# Patient Record
Sex: MALE | Race: WHITE | NOT HISPANIC OR LATINO | URBAN - METROPOLITAN AREA
[De-identification: names, ages, dates, MRNs, and addresses within clinical notes are randomized per-mention and may not be internally consistent; named-entity substitution may affect disease eponyms.]

---

## 2021-11-23 ENCOUNTER — HOSPITAL ENCOUNTER (EMERGENCY)
Facility: HOSPITAL | Age: 26
Discharge: HOME | End: 2021-11-23
Attending: EMERGENCY MEDICINE | Admitting: EMERGENCY MEDICINE

## 2021-11-23 ENCOUNTER — APPOINTMENT (OUTPATIENT)
Dept: GENERAL RADIOLOGY | Facility: HOSPITAL | Age: 26
End: 2021-11-23

## 2021-11-23 VITALS
BODY MASS INDEX: 23.7 KG/M2 | HEART RATE: 75 BPM | DIASTOLIC BLOOD PRESSURE: 90 MMHG | WEIGHT: 160 LBS | TEMPERATURE: 97.3 F | HEIGHT: 69 IN | RESPIRATION RATE: 16 BRPM | SYSTOLIC BLOOD PRESSURE: 166 MMHG | OXYGEN SATURATION: 99 %

## 2021-11-23 DIAGNOSIS — S52.124A CLOSED NONDISPLACED FRACTURE OF HEAD OF RIGHT RADIUS, INITIAL ENCOUNTER: Primary | ICD-10-CM

## 2021-11-23 PROCEDURE — 73080 X-RAY EXAM OF ELBOW: CPT | Performed by: FAMILY MEDICINE

## 2021-11-23 PROCEDURE — 99283 EMERGENCY DEPT VISIT LOW MDM: CPT

## 2021-11-23 RX ORDER — HYDROCODONE BITARTRATE AND ACETAMINOPHEN 7.5; 325 MG/1; MG/1
1 TABLET ORAL EVERY 6 HOURS PRN
Qty: 12 TABLET | Refills: 0 | Status: SHIPPED | OUTPATIENT
Start: 2021-11-23 | End: 2021-12-06

## 2021-11-23 RX ORDER — HYDROCODONE BITARTRATE AND ACETAMINOPHEN 7.5; 325 MG/1; MG/1
1 TABLET ORAL ONCE
Status: COMPLETED | OUTPATIENT
Start: 2021-11-23 | End: 2021-11-23

## 2021-11-23 RX ADMIN — HYDROCODONE BITARTRATE AND ACETAMINOPHEN 1 TABLET: 7.5; 325 TABLET ORAL at 20:33

## 2021-11-24 NOTE — ED PROVIDER NOTES
EMERGENCY DEPARTMENT ENCOUNTER    Room Number:  A01/01  Date of encounter:  11/24/2021  PCP: Provider, No Known  Historian: Patient      HPI:  Chief Complaint: right elbow pain       Context: Arnold Mitchell is a 26 y.o. RHD male who presents to the ED c/o right arm injury.  Patient states that around 4:00 today he was in a chair, fell, and landed on his right elbow.  Since that time he has had increasing pain and swelling in his right elbow that shoots to the right forearm.  Pain is throbbing in nature.  Pain is worse with pronation/supination, and extension/flexion.  Denies any other injuries, denies any head injury, numbness, tingling, or weakness.      PAST MEDICAL HISTORY  Active Ambulatory Problems     Diagnosis Date Noted   • No Active Ambulatory Problems     Resolved Ambulatory Problems     Diagnosis Date Noted   • No Resolved Ambulatory Problems     No Additional Past Medical History         PAST SURGICAL HISTORY  History reviewed. No pertinent surgical history.      FAMILY HISTORY  History reviewed. No pertinent family history.      SOCIAL HISTORY  Social History     Socioeconomic History   • Marital status: Single   Tobacco Use   • Smoking status: Never Smoker   • Smokeless tobacco: Never Used   Substance and Sexual Activity   • Alcohol use: Not Currently   • Sexual activity: Defer         ALLERGIES  Patient has no known allergies.        REVIEW OF SYSTEMS  Review of Systems     All systems reviewed and negative except for those discussed in HPI.       PHYSICAL EXAM    I have reviewed the triage vital signs and nursing notes.    ED Triage Vitals [11/23/21 1906]   Temp Heart Rate Resp BP SpO2   97.3 °F (36.3 °C) 75 16 166/90 99 %      Temp src Heart Rate Source Patient Position BP Location FiO2 (%)   Tympanic -- -- -- --       Physical Exam  GENERAL: Alert and oriented x3, not distressed  HENT: nares patent  EYES: no scleral icterus  CV: regular rhythm, regular rate, intact radial pulse  RESPIRATORY: normal  effort  MUSCULOSKELETAL: no deformity, decreased ROM at the right elbow   NEURO: alert, moves all extremities, follows commands  SKIN: warm, dry, intact        LAB RESULTS  No results found for this or any previous visit (from the past 24 hour(s)).    Ordered the above labs and independently reviewed the results.        RADIOLOGY  XR Elbow 3+ View Right    Result Date: 11/23/2021  THREE RADIOGRAPHIC VIEWS OF THE RIGHT ELBOW  CLINICAL HISTORY: Right elbow pain after fall today.  FINDINGS: Three radiographic views of the right elbow demonstrate a displaced anterior and posterior fat pad secondary to a nondisplaced radial head fracture.  These findings were contemporaneously discussed with Dr. Cerna.  This report was finalized on 11/23/2021 7:21 PM by Dr. Dipak Yeung M.D.        I ordered the above noted radiological studies. Reviewed by me and discussed with radiologist.  See dictation for official radiology interpretation.      PROCEDURES    Procedures      MEDICATIONS GIVEN IN ER    Medications   HYDROcodone-acetaminophen (NORCO) 7.5-325 MG per tablet 1 tablet (1 tablet Oral Given 11/23/21 2033)         PROGRESS, DATA ANALYSIS, CONSULTS, AND MEDICAL DECISION MAKING    All labs have been independently reviewed by me.  All radiology studies have been reviewed by me and discussed with radiologist dictating the report.   EKG's independently viewed and interpreted by me.  Discussion below represents my analysis of pertinent findings related to patient's condition, differential diagnosis, treatment plan and final disposition.    DDx: Includes but not limited to forearm fracture, radial head fracture, elbow fracture, elbow sprain    ED Course as of 11/24/21 0331   e Nov 23, 2021 2045 Splint Application: Right arm  Splint Type: Posterior long-arm Ortho-Glass  Indication: Right radial head fracture  Splint placed by PA-C  Post splint application:   1) neurovascularly intact   2) good position  Discussed splint care  with patient  Discussed PMD/orthopedic follow up   [RUTHY]      ED Course User Index  [RUTHY] Grayson Hussein PA       MDM: Patient was seen at [facility] and sent to the ER for further evaluation of a nondisplaced radial head fracture.  X-ray images were able to be viewed remotely, patient was splinted, and given orthopedic referral.  Vital signs are stable, patient is safe for discharge.    PPE: The patient wore a surgical mask throughout the entire patient encounter. I wore an N95.    AS OF 03:31 EST VITALS:    BP - 166/90  HR - 75  TEMP - 97.3 °F (36.3 °C) (Tympanic)  O2 SATS - 99%        DIAGNOSIS  Final diagnoses:   Closed nondisplaced fracture of head of right radius, initial encounter         DISPOSITION  DISCHARGE    Patient discharged in stable condition.    Reviewed implications of results, diagnosis, meds, responsibility to follow up, warning signs and symptoms of possible worsening, potential complications and reasons to return to ER.    Patient/Family voiced understanding of above instructions.    Discussed plan for discharge, as there is no emergent indication for admission. Patient referred to primary care provider for BP management due to today's BP. Pt/family is agreeable and understands need for follow up and repeat testing.  Pt is aware that discharge does not mean that nothing is wrong but it indicates no emergency is present that requires admission and they must continue care with follow-up as given below or physician of their choice.     FOLLOW-UP  Claudio Pichardo MD  [address] [id number] [phone]    Schedule an appointment as soon as possible for a visit            Medication List      New Prescriptions    HYDROcodone-acetaminophen 7.5-325 MG per tablet  Commonly known as: NORCO  Take 1 tablet by mouth Every 6 (Six) Hours As Needed for Moderate Pain .           Where to Get Your Medications      You can get these medications from any pharmacy    Bring a  paper prescription for each of these medications  · HYDROcodone-acetaminophen 7.5-325 MG per tablet                    Grayson Hussein PA  11/24/21 033

## 2021-11-24 NOTE — DISCHARGE INSTRUCTIONS
Wear splint and sling, apply ice and elevate, pain medicine as needed, call first thing in the morning to schedule follow up with the orthopedist.

## 2021-11-24 NOTE — ED NOTES
C/o of radial fracture, was seen today at [facility].   This rn wearing mask and goggles. Pt wearing mask during encounter.        Marisol Melchor, LILLIAN  11/23/21 1905

## 2021-11-29 ENCOUNTER — OFFICE VISIT (OUTPATIENT)
Dept: ORTHOPEDIC SURGERY | Facility: CLINIC | Age: 26
End: 2021-11-29

## 2021-11-29 VITALS — BODY MASS INDEX: 26.42 KG/M2 | HEIGHT: 69 IN | WEIGHT: 178.4 LBS | TEMPERATURE: 97.8 F

## 2021-11-29 DIAGNOSIS — S52.124A CLOSED NONDISPLACED FRACTURE OF HEAD OF RIGHT RADIUS, INITIAL ENCOUNTER: Primary | ICD-10-CM

## 2021-11-29 PROCEDURE — 99204 OFFICE O/P NEW MOD 45 MIN: CPT | Performed by: ORTHOPAEDIC SURGERY

## 2021-11-29 RX ORDER — ACETAMINOPHEN 500 MG
500 TABLET ORAL EVERY 6 HOURS PRN
COMMUNITY

## 2021-11-29 NOTE — PROGRESS NOTES
"General Exam    Patient: Arnold Mitchell    YOB: 1995    Medical Record Number: [id number]    Chief Complaints: Right elbow pain    History of Present Illness:     26 y.o. male patient who presents for evaluation and treatment of right radial head fracture.  Patient was seen 6 days ago in the emergency department after a fall from a stepladder.  X-rays were taken and showed a nondisplaced right radial head fracture.  Patient was placed in a splint and sling total follow-up orthopedics.  Patient states he is been doing fine since still some pain.    Patient is right-hand dominant.    Denies any numbness or tingling.  Denies any fevers, cough or shortness of breath.    Allergies: No Known Allergies    Home Medications:      Current Outpatient Medications:   •  acetaminophen (TYLENOL) 500 MG tablet, Take 500 mg by mouth Every 6 (Six) Hours As Needed for Mild Pain ., Disp: , Rfl:   •  HYDROcodone-acetaminophen (NORCO) 7.5-325 MG per tablet, Take 1 tablet by mouth Every 6 (Six) Hours As Needed for Moderate Pain ., Disp: 12 tablet, Rfl: 0    No past medical history on file.    No past surgical history on file.    Social History     Occupational History   • Not on file   Tobacco Use   • Smoking status: Never Smoker   • Smokeless tobacco: Never Used   Substance and Sexual Activity   • Alcohol use: Not Currently   • Drug use: Not on file   • Sexual activity: Defer      Social History     Social History Narrative   • Not on file       No family history on file.    Review of Systems:      Constitutional: Denies fever, shaking or chills         All other pertinent positives and negatives as noted above in HPI.    Physical Exam: 26 y.o. male    Vitals:    11/29/21 0846   Temp: 97.8 °F (36.6 °C)   Weight: 80.9 kg (178 lb 6.4 oz)   Height: 175.3 cm (69\")       General:  Patient is awake and alert.  Appears in no acute distress or discomfort.        Musculoskeletal/Extremities:    Right upper extremity; splint removed.  " AIN, PIN and ulnar nerve intact.  Sensation tact distally.  Some elbow swelling.  Contralateral side.  Range of motion is limited due to pain and stiffness.  Was able to get him to about 20 degrees of extension and flexion to 100 1020 degrees just limited due to pain no apparent mechanical block.  Patient is able to pronate supinate but still limited due to some pain and soreness did not have mechanical block appearance.  Gait is intact.         Radiology:       AP and lateral right elbow taken reviewed to evaluate the patient's complaint/s.    Imaging shows no to minimal displacement radial head/neck fracture less than 2 mm.  Seems to be more of a cortical irregularity compared to initial injury films however the injury films there is a radiographic arrow right along the cortex so hard to fully appreciate if there is a true difference or not because the area was overlying the cortex.  Still some swelling with increased fat pad sign.  No other significant changes.        Assessment: Right radial head/neck fracture      Plan:      Recommend conservative treatment this time the cortical disruption is less than 2 mm with no significant displacement noted.  Did not appreciate a mechanical block but only limited to some pain and stiffness as he has been in a splint for the past week.  Recommend keep an eye on this we will have him wear a sling for comfort of the next week instructed him to take it at least 5 times a day to do gentle elbow range of motion with flexion extension some pronation supination.  Tylenol for pain avoid anti-inflammatories.  Calcium and vitamin D for bone healing.-Follow-up in 1 week and recheck films if there is any increase in displacement or of concern will refer him to elbow/hand specialist if needed otherwise we can continue conservative treatment.           We will plan for follow up 1 week.    All questions were answered.  Patient understands and agrees with the plan.    Claudio Pichardo,  MD    11/29/2021    CC to Provider, No Known

## 2021-12-02 ENCOUNTER — TELEPHONE (OUTPATIENT)
Dept: ORTHOPEDIC SURGERY | Facility: CLINIC | Age: 26
End: 2021-12-02

## 2021-12-02 NOTE — TELEPHONE ENCOUNTER
Provider: DR. ESTRELLA FIELD  Caller: [facility] [employer]  Relationship to Patient: W/C   Pharmacy:   Phone Number: [phone]  Reason for Call: NEED WORK STATUS FROM VISIT ON 11/29/21

## 2021-12-03 ENCOUNTER — PATIENT ROUNDING (BHMG ONLY) (OUTPATIENT)
Dept: ORTHOPEDIC SURGERY | Facility: CLINIC | Age: 26
End: 2021-12-03

## 2021-12-03 NOTE — PROGRESS NOTES
December 3, 2021    Hello, may I speak with Arnoldfreedom Mitchell?    My name is Tiana     I am  with [facility]  [facility]  [address] [address] [phone].    Before we get started may I verify your date of birth? 1995    I am calling to officially welcome you to our practice and ask about your recent visit. Is this a good time to talk? Left messge     Tell me about your visit with us. What things went well?  left message      We're always looking for ways to make our patients' experiences even better. Do you have recommendations on ways we may improve? left message     Overall were you satisfied with your first visit to our practice? Left message        I appreciate you taking the time to speak with me today. Is there anything else I can do for you? no    Thank you, and have a great day.

## 2021-12-06 ENCOUNTER — OFFICE VISIT (OUTPATIENT)
Dept: ORTHOPEDIC SURGERY | Facility: CLINIC | Age: 26
End: 2021-12-06

## 2021-12-06 VITALS — TEMPERATURE: 97.6 F | BODY MASS INDEX: 24.88 KG/M2 | WEIGHT: 168 LBS | HEIGHT: 69 IN

## 2021-12-06 DIAGNOSIS — S52.124D CLOSED NONDISPLACED FRACTURE OF HEAD OF RIGHT RADIUS WITH ROUTINE HEALING, SUBSEQUENT ENCOUNTER: Primary | ICD-10-CM

## 2021-12-06 DIAGNOSIS — R52 PAIN: ICD-10-CM

## 2021-12-06 PROCEDURE — 99212 OFFICE O/P EST SF 10 MIN: CPT | Performed by: ORTHOPAEDIC SURGERY

## 2021-12-06 PROCEDURE — 73070 X-RAY EXAM OF ELBOW: CPT | Performed by: ORTHOPAEDIC SURGERY

## 2021-12-06 NOTE — PROGRESS NOTES
"Follow Up      Patient: Arnold Mitchell        YOB: 1995            Chief Complaints: Right arm pain      History of Present Illness: Patient presents 1 week follow-up status post right radial head/neck fracture.  Patient has mainly been able to come out of the sling feels that pain and swelling have improved range of motion has improved.  Denies any distal numbness or tingling.      Physical Exam: 26 y.o. male  General Appearance:    Alert, cooperative, in no acute distress                   Vitals:    12/06/21 0954   Temp: 97.6 °F (36.4 °C)   Weight: 76.2 kg (168 lb)   Height: 175.3 cm (69\")        Patient is alert and read ×3 no acute distress appears her above-listed at height weight and age.  Affect is normal respiratory rate is normal unlabored. Heart rate regular rate rhythm, sclera, dentition and hearing are normal for the purpose of this exam.      Ortho Exam    Right upper extremity; no overt pain with palpation to the right elbow area.  Range of motion flexion full equal contralateral side extension limiting about 20 degrees but no mechanical block noted supination pronation improved.  Neuromuscular intact distally.      Radiographs; 2 views right elbow/forearm taken reviewed imaging shows no increase of fracture displacement is less than 2 mm compared to recent imaging.    Assessment/Plan:      Right radial head fracture    Patient's function is improved is no mechanical block.  Radiographs do not show any worsening of fracture alignment.  Plan continue conservative measures and management patient will follow up in 1 week with repeat films to assess at that point if continuing to look good can span out visits and we will plan initiate physical therapy shortly.  Nonweightbearing to right upper extremity at this time.  Continue calcium vitamin D for bone health and healing.  Tylenol for pain avoid anti-inflammatories at this time.  Patient may discontinue sling.  All questions answered.  Patient " understands agrees with plan.

## 2021-12-13 ENCOUNTER — OFFICE VISIT (OUTPATIENT)
Dept: ORTHOPEDIC SURGERY | Facility: CLINIC | Age: 26
End: 2021-12-13

## 2021-12-13 VITALS — RESPIRATION RATE: 18 BRPM | HEIGHT: 69 IN | WEIGHT: 168 LBS | TEMPERATURE: 98.2 F | BODY MASS INDEX: 24.88 KG/M2

## 2021-12-13 DIAGNOSIS — R52 PAIN: ICD-10-CM

## 2021-12-13 DIAGNOSIS — S52.124D CLOSED NONDISPLACED FRACTURE OF HEAD OF RIGHT RADIUS WITH ROUTINE HEALING, SUBSEQUENT ENCOUNTER: Primary | ICD-10-CM

## 2021-12-13 PROCEDURE — 73070 X-RAY EXAM OF ELBOW: CPT | Performed by: ORTHOPAEDIC SURGERY

## 2021-12-13 PROCEDURE — 99212 OFFICE O/P EST SF 10 MIN: CPT | Performed by: ORTHOPAEDIC SURGERY

## 2021-12-13 NOTE — PROGRESS NOTES
"Elbow follow Up      Patient: Arnold Mitchell        YOB: 1995            Chief Complaints: Right elbow pain      History of Present Illness: Patient presents 3 weeks status post right radial head neck fracture.  States his pain and range of motion have continued to in proved.  Denies any distal numbness or tingling.  No other complaints.      Physical Exam: 26 y.o. male  General Appearance:    Alert, cooperative, in no acute distress                   Vitals:    12/13/21 0947   Resp: 18   Temp: 98.2 °F (36.8 °C)   Weight: 76.2 kg (168 lb)   Height: 175.3 cm (69\")        Patient is alert and read ×3 no acute distress appears her above-listed at height weight and age.  Affect is normal respiratory rate is normal unlabored. Heart rate regular rate rhythm, sclera, dentition and hearing are normal for the purpose of this exam.      Ortho Exam      Right upper extremity: Swelling decreased.  No tenderness palpation.  Range of motion improved flexion is full equal contralateral side about 5 degrees extensor lag compared to the contralateral side.  Improved supination pronation minimal discomfort.  Neurovascular tact distally.    Radiographs:  2 views of the right elbow taken reviewed and compared to previous films show no significant change or displacement of fracture.      Assessment/Plan:      3 weeks status post right radial head/neck fracture    Plan to continue conservative management.  No significant changes seen on imaging.  Range of motion and pain and continue to improve.  We will order some formal physical therapy they can start next week.  Still no heavy lifting pushing or pulling.  Nothing more than 1 to 2 pounds.  Recommend with therapy some gentle stretching range of motion and can start a strengthening at the 6-week onel solid x-rays look good.  Continue calcium and vitamin D for bone health.  Tylenol for pain.  Patient will follow up with me in 3 weeks.  All questions were answered.  Patient " understands agrees with plan.

## 2021-12-14 ENCOUNTER — TELEPHONE (OUTPATIENT)
Dept: ORTHOPEDIC SURGERY | Facility: CLINIC | Age: 26
End: 2021-12-14

## 2021-12-14 NOTE — TELEPHONE ENCOUNTER
Caller: RONEN    Relationship: SUSAN JOHNSON    Best call back number: [phone]    What form or medical record are you requesting: OFFICE NOTES FROM 11/29, 12/6, 12/13 AND WORK RESTRICTIONS    Who is requesting this form or medical record from you: SUSAN JOHNSON    How would you like to receive the form or medical records (pick-up, mail, fax):  If fax, what is the fax number: [phone]    Timeframe paperwork needed: ASAP

## 2021-12-15 ENCOUNTER — HOSPITAL ENCOUNTER (OUTPATIENT)
Dept: PHYSICAL THERAPY | Facility: HOSPITAL | Age: 26
Setting detail: THERAPIES SERIES
Discharge: HOME | End: 2021-12-15

## 2021-12-15 DIAGNOSIS — M25.621 DECREASED RANGE OF MOTION OF RIGHT ELBOW: ICD-10-CM

## 2021-12-15 DIAGNOSIS — S52.124D CLOSED NONDISPLACED FRACTURE OF HEAD OF RIGHT RADIUS WITH ROUTINE HEALING, SUBSEQUENT ENCOUNTER: Primary | ICD-10-CM

## 2021-12-15 DIAGNOSIS — R29.898 DECREASED GRIP STRENGTH OF RIGHT HAND: ICD-10-CM

## 2021-12-15 PROCEDURE — 97161 PT EVAL LOW COMPLEX 20 MIN: CPT

## 2021-12-15 PROCEDURE — 97110 THERAPEUTIC EXERCISES: CPT

## 2021-12-15 NOTE — THERAPY EVALUATION
Outpatient Physical Therapy Ortho Initial Evaluation  [facility]     Patient Name: Arnold Mitchell  : 1995  MRN: [id number]  Today's Date: 12/15/2021      Visit Date: 12/15/2021    There is no problem list on file for this patient.       No past medical history on file.     No past surgical history on file.    Visit Dx:     ICD-10-CM ICD-9-CM   1. Closed nondisplaced fracture of head of right radius with routine healing, subsequent encounter  S52.124D V54.12   2. Decreased range of motion of right elbow  M25.621 719.52   3. Decreased  strength of right hand  R29.898 729.89          Patient History     Row Name 12/15/21 1600             History    Chief Complaint Pain  -SANTOSH      Type of Pain Elbow pain  -SANTOSH      Date Current Problem(s) Began 21  -SANTOSH      Brief Description of Current Complaint Arnold Mitchell is a 26 y.o. male who presents to physical therapy today with primary compliant of R elbow/forearm pain that began 21 when patient fell onto R elbow  when climbing up step ladder at work. Patient experienced closed non-displaced fracture of right radial head. Patient saw MD on 21 and was taken out of sling and released to lift no more than 1-2 pounds, no push/pull, and gentle strength/ROM. Patient reports he is not currently working and expected to return to work (light duty)  per MD. Patient expresses he is required to lift 30-50 pounds intermittently while working.  Arnold Mitchell reports difficulty/increased pain with forward reaching, quick elbow flexion/extension, resting on elbow, opening jars and gripping. Pain relieving factors include tylenol, Calcium supplement, and ice. PMH includes L wrist fracture 12 years ago. Arnold Mitchell primary goal for attending skilled physical therapy is to improve mobility/strength and return to work related duties.  -SANTOSH      Previous treatment for THIS PROBLEM Medication; Other (comment)  splint/sling  -SANTOSH      Patient/Caregiver Goals Relieve  pain; Improve mobility; Improve strength; Know what to do to help the symptoms; Return to prior level of function; Return to work  -SANTOSH      Hand Dominance right-handed  -SANTOSH      Occupation/sports/leisure activities works at [employer]  -SANTOSH      What clinical tests have you had for this problem? X-ray  -SANTOSH      Results of Clinical Tests see epic  -SANTOSH              Pain     Pain Location Elbow  -SANTOSH      Pain at Present 0  -SANTOSH      Pain at Best 0  -SANTOSH      Pain at Worst 8  -SANTOSH      Pain Frequency Intermittent  -SANTOSH      Pain Description Shooting; Sore  -SANTOSH      What Performance Factors Make the Current Problem(s) WORSE? forward reaching, quick elbow flexion/extension, resting on elbow, opening jars and gripping  -SANTOSH      What Performance Factors Make the Current Problem(s) BETTER? tylenol, Calcium supplement, ice  -SANTOSH      Is your sleep disturbed? Yes  -SANTOSH              Fall Risk Assessment    Any falls in the past year: Yes  -SANTOSH      Number of falls reported in the last 12 months 1  -SANTOSH      Factors that contributed to the fall: --  fell off ladder at work leading to radial head fracture  -SANTOSH              Services    Prior Rehab/Home Health Experiences No  -SANTOSH              Daily Activities    Primary Language English  -SANTOSH      Pt Participated in POC and Goals Yes  -SANTOSH              Safety    Are you being hurt, hit, or frightened by anyone at home or in your life? No  -SANTOSH      Are you being neglected by a caregiver No  -SANTOSH      Have you had any of the following issues with N/A  -SANTOSH            User Key  (r) = Recorded By, (t) = Taken By, (c) = Cosigned By    Initials Name Provider Type    Iveth Sun, PT Physical Therapist                 PT Ortho     Row Name 12/15/21 1600       Precautions and Contraindications    Precautions see epic referral  -SANTOSH       Posture/Observations    Alignment Options Forward head; Rounded shoulders  -SANTOSH    Forward Head Mild  -SANTOSH    Rounded Shoulders Mild  -SANTOSH     Observations Edema  -SANTOSH    Posture/Observations Comments increased R elbow carrying angle  -SANTOSH       Special Tests/Palpation    Special Tests/Palpation Elbow/Forearm  -SANTOSH       Elbow/Forearm Palpation    Elbow/Forearm Palpation? Yes  -SANTOSH    Radial Head Tender  -SANTOSH       General ROM    RT Upper Ext Rt Elbow Extension/Flexion; Rt Elbow Supination; Rt Elbow Pronation; Rt Wrist Flexion; Rt Wrist Extension  -SANTOSH    LT Upper Ext Lt Elbow Extension/Flexion; Lt Elbow Supination; Lt Elbow Pronation  -SANTOSH       Right Upper Ext    Rt Elbow Extension/Flexion AROM lacking   pain noted with flexion  -SANTOSH    Rt Elbow Supination AROM 55  -SANTOSH    Rt Elbow Pronation AROM 75  -SANTOSH    Rt Wrist Flexion AROM 85  -SANTOSH    Rt Wrist Extension AROM 48  -SANTOSH       Left Upper Ext    Lt Elbow Extension/Flexion AROM 2-0-141  -SANTOSH    Lt Elbow Supination AROM 65  -SANTOSH    Lt Elbow Pronation AROM 78  -SANTOSH       MMT (Manual Muscle Testing)    Rt Upper Ext Rt Shoulder Flexion; Rt Shoulder ABduction; Rt Shoulder Internal Rotation; Rt Shoulder External Rotation; Rt Elbow Flexion; Rt Elbow Extension; Rt Forearm Supination; Rt Forearm Pronation; Rt Wrist Flexion; Rt Wrist Extension  -SANTOSH    Lt Upper Ext Lt Shoulder Flexion; Lt Shoulder ABduction; Lt Shoulder Internal Rotation; Lt Shoulder External Rotation; Lt Elbow Extension; Lt Elbow Flexion; Lt Forearm Supination; Lt Forearm Pronation; Lt Wrist Flexion; Lt Wrist Extension  -SANTOSH       MMT Right Upper Ext    Rt Shoulder Flexion MMT, Gross Movement (4/5) good  -SANTOSH    Rt Shoulder ABduction MMT, Gross Movement (4+/5) good plus  -SANTOSH    Rt Shoulder Internal Rotation MMT, Gross Movement (4/5) good  -SANTOSH    Rt Shoulder External Rotation MMT, Gross Movement (4/5) good  -SANTOSH    Rt Elbow Flexion MMT, Gross Movement: (4/5) good  -SANTOSH    Rt Elbow Extension MMT, Gross Movement: (4-/5) good minus  pain noted  -SANTOSH    Rt Forearm Supination MMT, Gross Movement (4/5) good  pain noted  -SANTOSH    Rt Forearm Pronation MMT, Gross Movement  (4/5) good  -SANTOSH    Rt Wrist Flexion MMT, Gross Movement (4/5) good  -SANTOSH    Rt Wrist Extension MMT, Gross Movement (4-/5) good minus  pain noted  -SANTOSH       MMT Left Upper Ext    Lt Shoulder Flexion MMT, Gross Movement (4+/5) good plus  -SANTOSH    Lt Shoulder ABduction MMT, Gross Movement (4+/5) good plus  -SANTOSH    Lt Shoulder Internal Rotation MMT, Gross Movement (5/5) normal  -SANTOSH    Lt Shoulder External Rotation MMT, Gross Movement (4+/5) good plus  -SANTOSH    Lt Elbow Flexion MMT, Gross Movement (5/5) normal  -SANTOSH    Lt Elbow Extension MMT, Gross Movement (5/5) normal  -SANTOSH    Lt Forearm Supination MMT, Gross Movement (5/5) normal  -SANTOSH    Lt Forearm Pronation MMT, Gross Movement (5/5) normal  -SANTOSH    Lt Wrist Flexion MMT, Gross Movement (5/5) normal  -SANTOSH    Lt Wrist Extension MMT, Gross Movement (5/5) normal  -SANTOSH        Strength Right    # Reps 3  -SANTOSH    Right Rung 2  -SANTOSH    Right  Test 1 20  -SANTOSH    Right  Test 2 25  -SANTOSH    Right  Test 3 20  -SANTOSH     Strength Average Right 21.67  -SANTOSH        Strength Left    # Reps 3  -SANTOSH    Left Rung 2  -SANTOSH    Left  Test 1 76  -SANTOSH    Left  Test 2 50  -SANTOSH    Left  Test 3 70  -SANTOSH     Strength Average Left 65.33  -SANTOSH       Sensation    Sensation WNL? WNL  -SANTOSH       Hand  Strength     Strength Affected Side Bilateral  -SANTOSH          User Key  (r) = Recorded By, (t) = Taken By, (c) = Cosigned By    Initials Name Provider Type    Iveth Sun, PT Physical Therapist                            Therapy Education  Education Details: Educated on anatomy/pathology, therapy expectations, evaluation findings, current restrictions per MD order, POC and HEP  Given: HEP, Symptoms/condition management, Pain management  Program: New  How Provided: Verbal, Demonstration, Written  Provided to: Patient  Level of Understanding: Verbalized, Demonstrated  92506 - PT Self Care/Mgmt Minutes: 5      PT OP Goals     Row Name 12/15/21 1600          PT Short Term  Goals    STG Date to Achieve 01/14/22  -SANTOSH     STG 1 Pt will be independent and compliant with initial HEP for long term management of symptoms and prevention of future occurrence.  -SANTOSH     STG 1 Progress New  -     STG 2 Pt will report pain rated 3/10 at worst in order to demonstrate ability to return to normalized ADLs, functional activities, and light duty work related activities.  -SANTOSH     STG 2 Progress New  -     STG 3 Pt will demonstrate R elbow ROM ext=5 deg in order to improve ability to perform ADLs.  -     STG 3 Progress New  -            Long Term Goals    LTG Date to Achieve 02/13/22  -SANTOSH     LTG 1 Pt will be independent and compliant with advanced HEP for long term management of symptoms and prevention of future occurrence.  -SANTOSH     LTG 1 Progress New  -     LTG 2 Pt will demonstrate R elbow ROM 0-140 in order to improve ability to perform ADLs and participate and functional related tasks.  -     LTG 2 Progress New  -     LTG 3 Pt will demonstrate R  strength to 50# in order to increase ability to perform fine motor tasks including gripping and writing while participating in work related duties.  -     LTG 3 Progress New  -     LTG 4 Pt will reduce level of perceived disability as measured by the DASH  to 25% in order to improve QOL.  -     LTG 4 Progress New  -     LTG 5 Patient will report returning to recreational activity of playing pool for 3x with 0/10 pain to return to leisure activities and PLOF.  -     LTG 5 Progress New  -     LTG 6 Patient will report returning to full duty for 2 weeks with </= 2/10 R elbow pain with all work related duties to demonstrate ability to return to work and PLOF.  -     LTG 6 Progress New  [facility]            Time Calculation    PT Goal Re-Cert Due Date 03/15/22  -           User Key  (r) = Recorded By, (t) = Taken By, (c) = Cosigned By    Initials Name Provider Type    Iveth Sun, PT Physical Therapist                  PT Assessment/Plan     Row Name 12/15/21 1600          PT Assessment    Functional Limitations Impaired locomotion; Limitation in home management; Limitations in community activities; Limitations in functional capacity and performance; Performance in leisure activities; Performance in work activities; Performance in self-care ADL  -SANTOSH     Impairments Edema; Impaired flexibility; Joint integrity; Joint mobility; Locomotion; Motor function; Muscle strength; Pain; Posture; Range of motion  -SANTOSH     Assessment Comments Arnold Mitchell is a 26 y.o. male referred to physical therapy for status post right radial head/neck fracture that occurred on 11/23/21 after falling down ladder while working. Patient saw MD on 12/13/21 and was taken out of sling and released to lift no more than 1-2 pounds, no push/pull, and gentle strength/ROM. Patient reports he is not currently working and expected to return to work (light duty) January 3rd per MD. Patient expresses he is required to lift 30-50 pounds intermittently while working. He presents with a stable clinical presentation, along with no remarkable comorbidities or personal factors that may impact his progress in the plan of care. Pt presents today with R elbow AROM of lacking  degrees of motion, generalized R forearm/wrist weakness and decreased R  strength. His signs and symptoms are consistent with referring diagnosis. The previous impairments limit his ability to lift objects coupled with forward reaching/quick elbow flexion/extension, rest on elbow, open jars and .  Pt will benefit from skilled PT to address the previous impairments and return to PLOF  -SANTOSH     Please refer to paper survey for additional self-reported information Yes  -SANTOSH     Rehab Potential Good  -SANTOSH     Patient/caregiver participated in establishment of treatment plan and goals Yes  -SANTOSH     Patient would benefit from skilled therapy intervention Yes  -SANTOSH            PT Plan    PT Frequency 2x/week   -SANTOSH     Predicted Duration of Therapy Intervention (PT) 12 visits  -SANTOSH     Planned CPT's? PT EVAL LOW COMPLEXITY: 76804; PT RE-EVAL: 19310; PT THER PROC EA 15 MIN: 68222; PT THER ACT EA 15 MIN: 51461; PT MANUAL THERAPY EA 15 MIN: 63991; PT NEUROMUSC RE-EDUCATION EA 15 MIN: 71736; PT SELF CARE/HOME MGMT/TRAIN EA 15: 54463; PT HOT OR COLD PACK TREAT [facility]; PT ELECTRICAL STIM UNATTEND:   -SANTOSH     PT Plan Comments Review HEP, gentle manual for ROM, follow protocol within referral,  No heavy lifting pushing or pulling.  Nothing more than 1 to 2 pounds.  Okay for some gentle stretching and strengthening no resistance training.  Possibly start with active assisted range of motion.  -SANTOSH           User Key  (r) = Recorded By, (t) = Taken By, (c) = Cosigned By    Initials Name Provider Type    Iveth Sun, PT Physical Therapist                   OP Exercises     Row Name 12/15/21 1600             Subjective Comments    Subjective Comments eval  -SANTOSH              Total Minutes    45498 - PT Therapeutic Exercise Minutes 10  -SANTOSH              Exercise 1    Exercise Name 1 AAROM elbow flexion/ext  -SANTOSH      Cueing 1 Verbal; Demo  -SANTOSH      Reps 1 10  -SANTOSH      Time 1 5s  -SANTOSH      Additional Comments gentle over pressure  -SANTOSH              Exercise 2    Exercise Name 2 AROM sup/pro  -SANTOSH      Cueing 2 Verbal; Demo  -SANTOSH      Reps 2 10  -SANTOSH      Time 2 5s  -SANTOSH      Additional Comments gentle over pressure  -SANTOSH              Exercise 3    Exercise Name 3 AROM wrist flex/ext  -SANTOSH      Cueing 3 Verbal; Demo  -SANTOSH      Reps 3 10  -SANTOSH      Time 3 5s  -SANTOSH      Additional Comments gentle over pressure  -SANTOSH              Exercise 4    Exercise Name 4 seated elbow flex/ext table slides  -SANTOSH      Cueing 4 Verbal; Demo  -SANTOSH      Reps 4 10  -SANTOSH      Time 4 5s  -SANTOSH      Additional Comments with towel  -SANTOSH            User Key  (r) = Recorded By, (t) = Taken By, (c) = Cosigned By    Initials Name Provider Type    Iveth Sun  [place], PT Physical Therapist                              Outcome Measure Options: Quick DASH  Quick DASH  Open a tight or new jar.: Severe Difficulty  Do heavy household chores (e.g., wash walls, wash floors): Moderate Difficulty  Carry a shopping bag or briefcase: Moderate Difficulty  Wash your back: Mild Difficulty  Use a knife to cut food: Mild Difficulty  Recreational activities in which you take some force or impact through your arm, should or hand (e.g. golf, hammering, tennis, etc.): Mild Difficulty  During the past week, to what extent has your arm, shoulder, or hand problem interfered with your normal social activites with family, friends, neighbors or groups?: Moderately  During the past week, were you limited in your work or other regular daily activities as a result of your arm, shoulder or hand problem?: Unable  Arm, Shoulder, or hand pain: Moderate  Tingling (pins and needles) in your arm, shoulder, or hand: None  During the past week, how much difficulty have you had sleeping because of the pain in your arm, shoulder or hand?: Mild Difficulty  Number of Questions Answered: 11  Quick DASH Score: 43.18         Time Calculation:     Start Time: 1615  Stop Time: 1655  Time Calculation (min): 40 min  Timed Charges  07753 - PT Therapeutic Exercise Minutes: 10  81729 - PT Self Care/Mgmt Minutes: 5  Untimed Charges  PT Eval/Re-eval Minutes: 25  Total Minutes  Timed Charges Total Minutes: 15  Untimed Charges Total Minutes: 25   Total Minutes: 40     Therapy Charges for Today     Code Description Service Date Service Provider Modifiers Qty    38840891768 HC PT THER PROC EA 15 MIN 12/15/2021 Iveth Miller, PT GP 1    78239773303 HC PT EVAL LOW COMPLEXITY 2 12/15/2021 Iveth Miller, PT GP 1          PT G-Codes  Outcome Measure Options: Quick DASH  Quick DASH Score: 43.18         Iveth Miller, PT  12/15/2021

## 2021-12-17 ENCOUNTER — HOSPITAL ENCOUNTER (OUTPATIENT)
Dept: PHYSICAL THERAPY | Facility: HOSPITAL | Age: 26
Setting detail: THERAPIES SERIES
Discharge: HOME | End: 2021-12-17

## 2021-12-17 DIAGNOSIS — M25.621 DECREASED RANGE OF MOTION OF RIGHT ELBOW: ICD-10-CM

## 2021-12-17 DIAGNOSIS — R29.898 DECREASED GRIP STRENGTH OF RIGHT HAND: ICD-10-CM

## 2021-12-17 DIAGNOSIS — S52.124D CLOSED NONDISPLACED FRACTURE OF HEAD OF RIGHT RADIUS WITH ROUTINE HEALING, SUBSEQUENT ENCOUNTER: Primary | ICD-10-CM

## 2021-12-17 PROCEDURE — 97140 MANUAL THERAPY 1/> REGIONS: CPT

## 2021-12-17 PROCEDURE — 97110 THERAPEUTIC EXERCISES: CPT

## 2021-12-17 NOTE — THERAPY TREATMENT NOTE
Outpatient Physical Therapy Lymphedema Treatment Note  [facility]     Patient Name: Arnold Mitchell  : 1995  MRN: [id number]  Today's Date: 2021        Visit Date: 2021    Visit Dx:    ICD-10-CM ICD-9-CM   1. Closed nondisplaced fracture of head of right radius with routine healing, subsequent encounter  S52.124D V54.12   2. Decreased range of motion of right elbow  M25.621 719.52   3. Decreased  strength of right hand  R29.898 729.89       There is no problem list on file for this patient.              PT Ortho     Row Name 21 0800       Subjective Pain    Post-Treatment Pain Level 4  -KA       Right Upper Ext    Rt Elbow Extension/Flexion AROM Lacking   -KA    Rt Elbow Supination AROM 60  -KA    Rt Elbow Pronation AROM 85  -KA    Rt Wrist Flexion AROM 85  -KA    Rt Wrist Extension AROM 50  -KA          User Key  (r) = Recorded By, (t) = Taken By, (c) = Cosigned By    Initials Name Provider Type    Roxana Hsieh, KIAN Physical Therapist                         PT Assessment/Plan     Row Name 21 0855          PT Assessment    Assessment Comments Pt with mild-moderate tissue restrictions today upon arrival in tricep, bicep, wrist flexors, and wrist extensors. Responded well to active neuromuscular releases and STM, tissues softened and elbow and wrist AROM improved post-hands on treatment.  He was able to tolerate progression of reps with exercises without increased pain, added gripping activity squeezing towel.  Pt remains a good candidate for skilled physical therapy.  -KA            PT Plan    PT Plan Comments May benefit from continued STM to reduce tissue restrictions, continue to focus on ROM, gentle strengthening not >1-2 lb per MD guidelines.  -LYNDSEY           User Key  (r) = Recorded By, (t) = Taken By, (c) = Cosigned By    Initials Name Provider Type    Roxana Hsieh, KIAN Physical Therapist                    OP Exercises     Row Name 21 0900 21  0800 12/17/21 0700       Subjective Comments    Subjective Comments -- Pt reports that he has been moving his arm and doing the exercises he was taught at [facility].  -KA --       Subjective Pain    Able to rate subjective pain? -- yes  -KA --    Pre-Treatment Pain Level -- 6  -KA --    Post-Treatment Pain Level -- 4  -KA --    Subjective Pain Comment -- Pt reports pain is in elbow and extends down into forearm  -KA --       Total Minutes    65220 - PT Therapeutic Exercise Minutes 12  -KA 12  -KA --    15505 - PT Manual Therapy Minutes 28  -KA -- 28  -KA       Exercise 1    Exercise Name 1 -- AROM elbow flexion/ext  -KA --    Cueing 1 -- Verbal; Demo  -KA --    Sets 1 -- 2  -KA --    Reps 1 -- 20  -KA --       Exercise 2    Exercise Name 2 -- AROM sup/pro  -KA --    Cueing 2 -- Verbal; Demo  -KA --    Sets 2 -- 2  -KA --    Reps 2 -- 20  -KA --       Exercise 3    Exercise Name 3 -- AROM wrist flex/ext  -KA --    Cueing 3 -- Verbal; Demo  -KA --    Sets 3 -- 2  -KA --    Reps 3 -- 20  -KA --       Exercise 4    Exercise Name 4 -- seated elbow flex/ext table slides  -KA --    Cueing 4 -- Verbal; Demo  -KA --    Sets 4 -- 2  -KA --    Reps 4 -- 10  -KA --    Time 4 -- 5s  -KA --    Additional Comments -- With towel  -KA --       Exercise 5    Exercise Name 5 -- Towel squeeze for  strength  -KA --    Cueing 5 -- Verbal; Tactile  -KA --    Sets 5 -- 2  -KA --    Reps 5 -- 10  -KA --    Time 5 -- 5 s hold  -KA --    Additional Comments -- Squeezing rolled up washcloth; painfree range only  -KA --          User Key  (r) = Recorded By, (t) = Taken By, (c) = Cosigned By    Initials Name Provider Type    Roxana Hsieh, PT Physical Therapist                        Manual Rx (last 36 hours)     Manual Treatments     Row Name 12/17/21 0900 12/17/21 0700          Total Minutes    35292 - PT Manual Therapy Minutes 28  -KA 28  -KA            Manual Rx 1    Manual Rx 1 Location -- Wrist flexors, wrist extensors, distal  attachment of triceps  -LYNDSEY     Manual Rx 1 Type -- STM  -KA     Manual Rx 1 Duration -- 18 minutes  -KA            Manual Rx 2    Manual Rx 2 Location -- Bicep, tricep, middle deltoid, wrist flexors, wrist extensors  -KA     Manual Rx 2 Type -- Active neuromuscular release  -KA     Manual Rx 2 Grade -- Pt moving through pain-free range  -KA     Manual Rx 2 Duration -- 10 minutes  -LYNDSEY           User Key  (r) = Recorded By, (t) = Taken By, (c) = Cosigned By    Initials Name Provider Type    Roxana Hsieh, PT Physical Therapist                    Therapy Education  Education Details: Discussed need to be well hydrated after STM to prevent soreness, progressed reps for HEP.  Given: HEP, Symptoms/condition management  Program: New, Reinforced  How Provided: Verbal  Provided to: Patient  Level of Understanding: Verbalized              Time Calculation:   Start Time: 0815  Stop Time: 0856  Time Calculation (min): 41 min  Total Timed Code Minutes- PT: 40 minute(s)  Timed Charges  43708 - PT Therapeutic Exercise Minutes: 12  70042 - PT Manual Therapy Minutes: 28  Total Minutes  Timed Charges Total Minutes: 40   Total Minutes: 40   Therapy Charges for Today     Code Description Service Date Service Provider Modifiers Qty    19097412055 HC PT THER PROC EA 15 MIN 12/17/2021 Roxana Cedillo, PT GP 1    23363062732 HC PT MANUAL THERAPY EA 15 MIN 12/17/2021 Roxana Cedillo, PT GP 2                    Roxana Cedillo, PT  12/17/2021

## 2021-12-22 ENCOUNTER — HOSPITAL ENCOUNTER (OUTPATIENT)
Dept: PHYSICAL THERAPY | Facility: HOSPITAL | Age: 26
Setting detail: THERAPIES SERIES
Discharge: HOME | End: 2021-12-22

## 2021-12-22 DIAGNOSIS — S52.124D CLOSED NONDISPLACED FRACTURE OF HEAD OF RIGHT RADIUS WITH ROUTINE HEALING, SUBSEQUENT ENCOUNTER: Primary | ICD-10-CM

## 2021-12-22 DIAGNOSIS — R29.898 DECREASED GRIP STRENGTH OF RIGHT HAND: ICD-10-CM

## 2021-12-22 DIAGNOSIS — M25.621 DECREASED RANGE OF MOTION OF RIGHT ELBOW: ICD-10-CM

## 2021-12-22 PROCEDURE — 97110 THERAPEUTIC EXERCISES: CPT | Performed by: PHYSICAL THERAPIST

## 2021-12-22 NOTE — THERAPY TREATMENT NOTE
Outpatient Physical Therapy Ortho Treatment Note  [facility]     Patient Name: Arnold Mitchell  : 1995  MRN: [id number]  Today's Date: 2021      Visit Date: 2021    Visit Dx:    ICD-10-CM ICD-9-CM   1. Closed nondisplaced fracture of head of right radius with routine healing, subsequent encounter  S52.124D V54.12   2. Decreased range of motion of right elbow  M25.621 719.52   3. Decreased  strength of right hand  R29.898 729.89       There is no problem list on file for this patient.       No past medical history on file.     No past surgical history on file.                     PT Assessment/Plan     Row Name 21 1000          PT Assessment    Assessment Comments Pt reports with improvements in discomfort and end range improvements in R UE eblow extension. Pt tolerated isomentric strengthening with light theraband along with 1 # resistance with R wrist motions in all planes. Gripping and sup/pron progressed within pain free range. Pt remains appropraite for skilled PT. Good tolerance with light strengthening per MD restrictions though mild soreness that resided. Pt educated to ice is soreness increases secondary to progression of exercise.  -GT            PT Plan    PT Plan Comments Continue progress with strength and stability, progress gripping holds and increase time, manual PRN  -GT           User Key  (r) = Recorded By, (t) = Taken By, (c) = Cosigned By    Initials Name Provider Type    GT Pritesh Dumont, PT Physical Therapist                   OP Exercises     Row Name 21 1000             Subjective Comments    Subjective Comments Pt reports no pain throughout the day. Only noticing discomfort when rolloing on it.  -GT              Subjective Pain    Able to rate subjective pain? yes  -GT      Pre-Treatment Pain Level 4  -GT              Total Minutes    80520 - PT Therapeutic Exercise Minutes 40  -GT              Exercise 1    Exercise Name 1 AROM elbow flexion/ext  -GT       Cueing 1 Verbal; Demo  -GT      Sets 1 3  -GT      Reps 1 10  -GT      Additional Comments 1#  -GT              Exercise 2    Exercise Name 2 AROM sup/pro with wooden tools  -GT      Cueing 2 Verbal; Demo  -GT      Sets 2 2  -GT      Reps 2 20  -GT              Exercise 3    Exercise Name 3 AROM wrist flex/ext  -GT      Cueing 3 Verbal; Demo  -GT      Sets 3 3  -GT      Reps 3 10  -GT      Additional Comments 1#  -GT              Exercise 4    Exercise Name 4 seated elbow flex/ext table slides  -GT      Cueing 4 Verbal; Demo  -GT      Sets 4 2  -GT      Reps 4 10  -GT      Time 4 5s  -GT              Exercise 5    Exercise Name 5 red gripper squeeze  -GT      Cueing 5 Verbal; Tactile  -GT      Sets 5 2  -GT      Reps 5 10 ea  -GT      Time 5 5 s hold  -GT      Additional Comments full extesion, and with elbow flexed at 90  -GT              Exercise 6    Exercise Name 6 R UE walk outs maintaining neutral wrist at ER position  -GT      Cueing 6 Verbal; Demo  -GT      Sets 6 3  -GT      Reps 6 10  -GT      Time 6 YTB  -GT              Exercise 7    Exercise Name 7 body blade, full extension and elbow at 90 deg  -GT      Cueing 7 Verbal; Demo  -GT      Sets 7 2 sets each  -GT      Reps 7 15 sec  -GT              Exercise 8    Exercise Name 8 Green flex bar  -GT      Cueing 8 Verbal; Demo  -GT      Sets 8 wrist flex/ext with 5 sec holds  -GT      Reps 8 20 reps each  -GT              Exercise 9    Exercise Name 9 shoulder flexion with palm up, down, and thumb up with neutral wrist  -GT      Cueing 9 Verbal; Demo  -GT      Reps 9 10 ea  -GT      Time 9 2#  -GT            User Key  (r) = Recorded By, (t) = Taken By, (c) = Cosigned By    Initials Name Provider Type    GT Pritesh Dumont, PT Physical Therapist                              PT OP Goals     Row Name 12/22/21 0900          PT Short Term Goals    STG Date to Achieve 01/14/22  -GT     STG 1 Pt will be independent and compliant with initial HEP for long term  management of symptoms and prevention of future occurrence.  -GT     STG 1 Progress Ongoing  -GT     STG 2 Pt will report pain rated 3/10 at worst in order to demonstrate ability to return to normalized ADLs, functional activities, and light duty work related activities.  -GT     STG 2 Progress Ongoing  -GT     STG 3 Pt will demonstrate R elbow ROM ext=5 deg in order to improve ability to perform ADLs.  -GT     STG 3 Progress Ongoing  -GT            Long Term Goals    LTG Date to Achieve 02/13/22  -GT     LTG 1 Pt will be independent and compliant with advanced HEP for long term management of symptoms and prevention of future occurrence.  -GT     LTG 1 Progress Ongoing  -GT     LTG 2 Pt will demonstrate R elbow ROM 0-140 in order to improve ability to perform ADLs and participate and functional related tasks.  -GT     LTG 2 Progress Ongoing  -GT     LTG 3 Pt will demonstrate R  strength to 50# in order to increase ability to perform fine motor tasks including gripping and writing while participating in work related duties.  -GT     LTG 3 Progress Ongoing  -GT     LTG 4 Pt will reduce level of perceived disability as measured by the DASH  to 25% in order to improve QOL.  -GT     LTG 4 Progress Ongoing  -GT     LTG 5 Patient will report returning to recreational activity of playing pool for 3x with 0/10 pain to return to leisure activities and PLOF.  -GT     LTG 5 Progress Ongoing  -GT     LTG 6 Patient will report returning to full duty for 2 weeks with </= 2/10 R elbow pain with all work related duties to demonstrate ability to return to work and PLOF.  -GT     LTG 6 Progress Ongoing  -GT           User Key  (r) = Recorded By, (t) = Taken By, (c) = Cosigned By    Initials Name Provider Type    Pritesh Calderon, PT Physical Therapist                Therapy Education  Education Details: progression of activity per protocol  Given: HEP, Symptoms/condition management  Program: New, Reinforced  How Provided:  Verbal  Provided to: Patient  Level of Understanding: Verbalized              Time Calculation:   Start Time: 1000  Stop Time: 1041  Time Calculation (min): 41 min  Timed Charges  05060 - PT Therapeutic Exercise Minutes: 40  Total Minutes  Timed Charges Total Minutes: 40   Total Minutes: 40  Therapy Charges for Today     Code Description Service Date Service Provider Modifiers Qty    35423546619 HC PT THER PROC EA 15 MIN 12/22/2021 Pritesh Dumont, PT GP 3                    Pritesh Dumont PT  12/22/2021

## 2021-12-27 ENCOUNTER — APPOINTMENT (OUTPATIENT)
Dept: PHYSICAL THERAPY | Facility: HOSPITAL | Age: 26
End: 2021-12-27

## 2021-12-30 ENCOUNTER — HOSPITAL ENCOUNTER (OUTPATIENT)
Dept: PHYSICAL THERAPY | Facility: HOSPITAL | Age: 26
Setting detail: THERAPIES SERIES
Discharge: HOME | End: 2021-12-30

## 2021-12-30 DIAGNOSIS — S52.124D CLOSED NONDISPLACED FRACTURE OF HEAD OF RIGHT RADIUS WITH ROUTINE HEALING, SUBSEQUENT ENCOUNTER: Primary | ICD-10-CM

## 2021-12-30 DIAGNOSIS — M25.621 DECREASED RANGE OF MOTION OF RIGHT ELBOW: ICD-10-CM

## 2021-12-30 DIAGNOSIS — R29.898 DECREASED GRIP STRENGTH OF RIGHT HAND: ICD-10-CM

## 2021-12-30 PROCEDURE — 97110 THERAPEUTIC EXERCISES: CPT

## 2021-12-30 NOTE — THERAPY TREATMENT NOTE
Outpatient Physical Therapy Ortho Treatment Note  [facility]     Patient Name: Arnold Mitchell  : 1995  MRN: [id number]  Today's Date: 2021      Visit Date: 2021    Visit Dx:    ICD-10-CM ICD-9-CM   1. Closed nondisplaced fracture of head of right radius with routine healing, subsequent encounter  S52.124D V54.12   2. Decreased range of motion of right elbow  M25.621 719.52   3. Decreased  strength of right hand  R29.898 729.89       There is no problem list on file for this patient.       No past medical history on file.     No past surgical history on file.     PT Ortho     Row Name 21       Right Upper Ext    Rt Elbow Extension/Flexion AROM Lacking 5-145  -LYNDSEY          User Key  (r) = Recorded By, (t) = Taken By, (c) = Cosigned By    Initials Name Provider Type    Roxana Hsieh, PT Physical Therapist                             PT Assessment/Plan     Row Name 21       PT Assessment    Assessment Comments -- Pt is responding well to skilled therapy, R  strength and R elbow ROM improving.  Progressed resistance with exercise up to 2# for most exercises and progressed gripping and wrist strengthening within pain-free range.  No pain with progression of exercises. Pt educated that he is still to stay within 2# limit until he sees MD next week.  Pt remains appropriate for skilled therapy.  -LYNDSEY       PT Plan    PT Plan Comments Progress strengthening within MD guidelines.  -LYNDSEY --          User Key  (r) = Recorded By, (t) = Taken By, (c) = Cosigned By    Initials Name Provider Type    Roxana Hsieh, PT Physical Therapist                   OP Exercises     Row Name 21             Subjective Comments    Subjective Comments No pain upon arrival, doing well with elbow and is doing well with exercises at home.  -LYNDSEY              Subjective Pain    Able to rate subjective pain? yes  -LYNDSEY      Pre-Treatment Pain Level 0  -LYNDSEY       Post-Treatment Pain Level 0  -KA              Total Minutes    54749 - PT Therapeutic Exercise Minutes 38  -KA              Exercise 1    Exercise Name 1 AROM elbow flexion/ext  -KA      Cueing 1 Verbal; Demo  -KA      Sets 1 2  -KA      Reps 1 10  -KA      Additional Comments 2#  -KA              Exercise 2    Exercise Name 2 AROM sup/pro with wooden tools  -KA      Cueing 2 Verbal; Demo  -KA      Sets 2 2  -KA      Reps 2 20  -KA              Exercise 3    Exercise Name 3 AROM wrist flex/ext  -KA      Cueing 3 Verbal; Demo  -KA      Sets 3 2  -KA      Reps 3 10  -KA      Additional Comments 2#  -KA              Exercise 5    Exercise Name 5 White gripper squeeze  -KA      Cueing 5 Verbal; Tactile  -KA      Sets 5 2  -KA      Reps 5 10 ea  -KA      Time 5 5 s hold  -KA      Additional Comments full extension, and with elbow flexed at 90  -KA              Exercise 6    Exercise Name 6 ABCs with arms extended  -KA      Cueing 6 Verbal; Demo  -KA      Reps 6 1 rep entire alphabet  -KA      Time 6 YTB  -KA              Exercise 7    Exercise Name 7 body blade, full extension and elbow at 90 deg  -KA      Cueing 7 Verbal; Demo  -KA      Sets 7 2 sets each  -KA      Reps 7 15 sec  -KA              Exercise 8    Exercise Name 8 Green flex bar  -KA      Cueing 8 Verbal; Demo  -KA      Reps 8 20 each  -KA      Additional Comments Bending into U, upside down U, and wringing both directions  -KA              Exercise 9    Exercise Name 9 shoulder flexion with palm up, down, and thumb up with neutral wrist  -KA      Cueing 9 Verbal; Demo  -KA      Sets 9 2  -KA      Reps 9 10 ea  -KA      Time 9 2#  -KA              Exercise 10    Exercise Name 10 UBE  -KA      Cueing 10 Verbal  -KA      Time 10 3 minutes forward, 3 minutes retro  -KA      Additional Comments L2  -KA              Exercise 11    Exercise Name 11 Wall walks (minimal pressure through hands)  -KA      Cueing 11 Verbal; Demo  -KA      Reps 11 3 reps  -KA       Additional Comments Pt instructed/cued to minimize weight through arms, standing almost completely upright  -            User Key  (r) = Recorded By, (t) = Taken By, (c) = Cosigned By    Initials Name Provider Type    Roxana Hsieh, PT Physical Therapist                              PT OP Goals     Row Name 12/30/21 0900          PT Short Term Goals    STG Date to Achieve 01/14/22  -KA     STG 1 Pt will be independent and compliant with initial HEP for long term management of symptoms and prevention of future occurrence.  -KA     STG 1 Progress Met  -KA     STG 2 Pt will report pain rated 3/10 at worst in order to demonstrate ability to return to normalized ADLs, functional activities, and light duty work related activities.  -KA     STG 2 Progress Ongoing  -KA     STG 2 Progress Comments Occasionally up to 6-7/10, mainly when rolling over in bed  -KA     STG 3 Pt will demonstrate R elbow ROM ext=5 deg in order to improve ability to perform ADLs.  -     STG 3 Progress Met  -            Long Term Goals    LTG Date to Achieve 02/13/22  -KA     LTG 1 Pt will be independent and compliant with advanced HEP for long term management of symptoms and prevention of future occurrence.  -KA     LTG 1 Progress Ongoing  -KA     LTG 2 Pt will demonstrate R elbow ROM 0-140 in order to improve ability to perform ADLs and participate and functional related tasks.  -KA     LTG 2 Progress Ongoing  -KA     LTG 2 Progress Comments 5-145 today  -KA     LTG 3 Pt will demonstrate R  strength to 50# in order to increase ability to perform fine motor tasks including gripping and writing while participating in work related duties.  -KA     LTG 3 Progress Ongoing  -KA     LTG 3 Progress Comments  strength 25# on R  -KA     LTG 4 Pt will reduce level of perceived disability as measured by the DASH  to 25% in order to improve QOL.  -KA     LTG 4 Progress Ongoing  -     LTG 5 Patient will report returning to recreational  activity of playing pool for 3x with 0/10 pain to return to leisure activities and PLOF.  -LYNDSEY     LTG 5 Progress Ongoing  -LYNDSEY     LTG 6 Patient will report returning to full duty for 2 weeks with </= 2/10 R elbow pain with all work related duties to demonstrate ability to return to work and PLOF.  -LYNDSEY     LTG 6 Progress Ongoing  -LYNDSEY           User Key  (r) = Recorded By, (t) = Taken By, (c) = Cosigned By    Initials Name Provider Type    Roxana Hsieh, PT Physical Therapist                Therapy Education  Education Details: Discussed progression of activity per protocol, still limited to 2# or less until he returns to MD next week.  Given: HEP, Symptoms/condition management  Program: New, Reinforced  How Provided: Verbal  Provided to: Patient  Level of Understanding: Verbalized              Time Calculation:   Start Time: 0907  Stop Time: 0945  Time Calculation (min): 38 min  Total Timed Code Minutes- PT: 38 minute(s)  Timed Charges  46952 - PT Therapeutic Exercise Minutes: 38  Total Minutes  Timed Charges Total Minutes: 38   Total Minutes: 38  Therapy Charges for Today     Code Description Service Date Service Provider Modifiers Qty    [id number]  PT THER PROC EA 15 MIN 12/30/2021 Roxana Cedillo, PT GP 3                    Roxana Cedillo PT  12/30/2021

## 2022-01-03 ENCOUNTER — HOSPITAL ENCOUNTER (OUTPATIENT)
Dept: PHYSICAL THERAPY | Facility: HOSPITAL | Age: 27
Setting detail: THERAPIES SERIES
Discharge: HOME | End: 2022-01-03

## 2022-01-03 ENCOUNTER — OFFICE VISIT (OUTPATIENT)
Dept: ORTHOPEDIC SURGERY | Facility: CLINIC | Age: 27
End: 2022-01-03

## 2022-01-03 VITALS — HEIGHT: 69 IN | WEIGHT: 168 LBS | BODY MASS INDEX: 24.88 KG/M2 | TEMPERATURE: 97.7 F | RESPIRATION RATE: 18 BRPM

## 2022-01-03 DIAGNOSIS — R52 PAIN: ICD-10-CM

## 2022-01-03 DIAGNOSIS — S52.124D CLOSED NONDISPLACED FRACTURE OF HEAD OF RIGHT RADIUS WITH ROUTINE HEALING, SUBSEQUENT ENCOUNTER: Primary | ICD-10-CM

## 2022-01-03 DIAGNOSIS — M25.621 DECREASED RANGE OF MOTION OF RIGHT ELBOW: ICD-10-CM

## 2022-01-03 DIAGNOSIS — R29.898 DECREASED GRIP STRENGTH OF RIGHT HAND: ICD-10-CM

## 2022-01-03 PROCEDURE — 97110 THERAPEUTIC EXERCISES: CPT

## 2022-01-03 PROCEDURE — 99212 OFFICE O/P EST SF 10 MIN: CPT | Performed by: ORTHOPAEDIC SURGERY

## 2022-01-03 PROCEDURE — 73070 X-RAY EXAM OF ELBOW: CPT | Performed by: ORTHOPAEDIC SURGERY

## 2022-01-03 NOTE — PROGRESS NOTES
"Elbow Follow Up      Patient: Arnold Mitchell        YOB: 1995            Chief Complaints: Elbow pain      History of Present Illness: Patient presents 6 weeks status post right radial neck fracture.  States he is doing well with pain and function.  He is doing physical therapy and continues to improve.  Some wrist pain at times he states but feels he may have slept on it funny.  Overall feels like he is doing well.      Physical Exam: 26 y.o. male  General Appearance:    Alert, cooperative, in no acute distress                   Vitals:    01/03/22 0954   Resp: 18   Temp: 97.7 °F (36.5 °C)   Weight: 76.2 kg (168 lb)   Height: 175.3 cm (69\")        Patient is alert and read ×3 no acute distress appears her above-listed at height weight and age.  Affect is normal respiratory rate is normal unlabored. Heart rate regular rate rhythm, sclera, dentition and hearing are normal for the purpose of this exam.      Ortho Exam    Right upper extremity: No tenderness palpation elbow range of motion full and painless.  No significant changes from recent exam.    Radiographs; 2 views right elbow taken reviewed evaluate patient's complaints.  Imaging shows some callus formation on the AP view.  Alignment is maintained no other sniffing changes compared to previous films.          Assessment/Plan:      6 weeks status post right radial neck/head fracture    Patient is doing well recommend continuing some therapy still no more than 1 to 2 pounds over the next 2 weeks and then weeks 3-4 5 to 10 pounds okay.  Still no heavy lifting pushing or pulling.  We will send work note and updated PT order.  Continue calcium and vitamin D.  Continue Tylenol for pain as needed.  Follow-up in 4 weeks with repeat imaging.  Patient understands agrees the plan.  All questions answered.            "

## 2022-01-03 NOTE — THERAPY TREATMENT NOTE
Outpatient Physical Therapy Ortho Treatment Note  [facility]     Patient Name: Arnold Mitchell  : 1995  MRN: [id number]  Today's Date: 1/3/2022      Visit Date: 2022    Visit Dx:    ICD-10-CM ICD-9-CM   1. Closed nondisplaced fracture of head of right radius with routine healing, subsequent encounter  S52.124D V54.12   2. Decreased range of motion of right elbow  M25.621 719.52   3. Decreased  strength of right hand  R29.898 729.89       There is no problem list on file for this patient.       No past medical history on file.     No past surgical history on file.                     PT Assessment/Plan     Row Name 22 0800          PT Assessment    Assessment Comments Mr. Mitchell returns to PT with reports of continuous improvement in R elbow pain/function with lingering pain isolated over distal ulnar styloid. He tolerated the addition of mallet interventions, supine skill crushers, and t-bar flex/ext without increased pain. Patient to see MD following session for follow up and updated x-rays. He remains a candidate for skilled PT.  -SANTOSH            PT Plan    PT Plan Comments Adjust program based on MD follow up.  -SANTOSH           User Key  (r) = Recorded By, (t) = Taken By, (c) = Cosigned By    Initials Name Provider Type    Iveth Sun, PT Physical Therapist                   OP Exercises     Row Name 22 0700             Subjective Comments    Subjective Comments I am feeling really good in my elbow. Really only have minor symptoms in my wrist. I also see MD tomorrow  -SANTOSH              Subjective Pain    Able to rate subjective pain? yes  -SANTOSH      Pre-Treatment Pain Level 0  -SANTOSH              Total Minutes    61650 - PT Therapeutic Exercise Minutes 44  -SANTOSH              Exercise 1    Exercise Name 1 AROM elbow flexion/ext  -SANTOSH      Cueing 1 Verbal; Demo  -SANTOSH      Sets 1 2  -SANTOSH      Reps 1 10  -SANTOSH      Additional Comments 2#  -SANTOSH              Exercise 2    Exercise Name 2 AROM  sup/pro/flex/ext with wooden tools  -SANTOSH      Cueing 2 Verbal; Demo  -SANTOSH      Sets 2 2  -SANTOSH      Reps 2 20  -SANTOSH              Exercise 3    Exercise Name 3 AROM wrist flex/ext  -SANTOSH      Cueing 3 Verbal; Demo  -SANTOSH      Sets 3 2  -SANTOSH      Reps 3 10  -SANTOSH      Additional Comments 2#  -SANTOSH              Exercise 5    Exercise Name 5 White gripper squeeze  -SANTOSH      Cueing 5 Verbal; Tactile  -SANTOSH      Sets 5 2  -SANTOSH      Reps 5 10 ea  -SANTOSH      Time 5 5 s hold  -SANTOSH      Additional Comments full extension, and with elbow flexed at 90  -SANTOSH              Exercise 6    Exercise Name 6 ABCs with arms extended  -SANTOSH      Cueing 6 Verbal; Demo  -SANTOSH      Reps 6 1 rep entire alphabet  -SANTOSH      Time 6 YTB  -SANTOSH              Exercise 7    Exercise Name 7 body blade, full extension and elbow at 90 deg  -SANTOSH      Cueing 7 Verbal; Demo  -SANTOSH      Sets 7 2 sets each  -SANTOSH      Reps 7 15 sec  -SANTOSH              Exercise 8    Exercise Name 8 Green flex bar  -SANTOSH      Cueing 8 Verbal; Demo  -SANTOSH      Reps 8 20 each  -SANTOSH      Time 8 10reps 90/90, 10 reps extended  -SANTOSH      Additional Comments Bending into U, upside down U, and wringing both directions  -SANTOSH              Exercise 9    Exercise Name 9 shoulder flexion with palm up, down, and thumb up with neutral wrist  -SANTOSH      Cueing 9 Verbal; Demo  -SANTOSH      Sets 9 --  -SANTOSH      Reps 9 10 ea  -SANTOSH      Time 9 2#  -SANTOSH              Exercise 10    Exercise Name 10 UBE  -SANTOSH      Cueing 10 Verbal  -SANTOSH      Time 10 3 minutes forward, 3 minutes retro  -SANTOSH      Additional Comments L2  -SANTOSH              Exercise 11    Exercise Name 11 --  -SANTOSH      Cueing 11 --  -SANTOSH      Reps 11 --  -SANTOSH      Additional Comments --  -SANTOSH              Exercise 12    Exercise Name 12 mallet sup/pro/UD/RD  -SANTOSH      Cueing 12 Verbal; Demo  -SANTOSH      Reps 12 20  -SANTOSH      Time 12 3s  -SANTOSH              Exercise 13    Exercise Name 13 supine skull   -SANTOSH      Cueing 13 Verbal; Demo  -SANTOSH      Sets 13 2  -SANTOSH      Reps 13 10  -SANTOSH      Additional  Comments 2#  -SANTOSH              Exercise 14    Exercise Name 14 T-bar wrist flex/ext roll ups  -      Cueing 14 Verbal; Demo  -      Sets 14 2  -      Reps 14 10  -SANTOSH      Additional Comments 1#  -            User Key  (r) = Recorded By, (t) = Taken By, (c) = Cosigned By    Initials Name Provider Type    Iveth Sun, PT Physical Therapist                              PT OP Goals     Row Name 01/03/22 0700          PT Short Term Goals    STG Date to Achieve 01/14/22  -     STG 1 Pt will be independent and compliant with initial HEP for long term management of symptoms and prevention of future occurrence.  -     STG 1 Progress Met  -     STG 2 Pt will report pain rated 3/10 at worst in order to demonstrate ability to return to normalized ADLs, functional activities, and light duty work related activities.  -     STG 2 Progress Ongoing  -SANTOSH     STG 3 Pt will demonstrate R elbow ROM ext=5 deg in order to improve ability to perform ADLs.  -     STG 3 Progress Met  -            Long Term Goals    LTG Date to Achieve 02/13/22  -     LTG 1 Pt will be independent and compliant with advanced HEP for long term management of symptoms and prevention of future occurrence.  -     LTG 1 Progress Ongoing  -SANTOSH     LTG 2 Pt will demonstrate R elbow ROM 0-140 in order to improve ability to perform ADLs and participate and functional related tasks.  -     LTG 2 Progress Ongoing  -SANTOSH     LTG 3 Pt will demonstrate R  strength to 50# in order to increase ability to perform fine motor tasks including gripping and writing while participating in work related duties.  -     LTG 3 Progress Ongoing  -SANTOSH     LTG 4 Pt will reduce level of perceived disability as measured by the DASH  to 25% in order to improve QOL.  -     LTG 4 Progress Ongoing  -SANTOSH     LTG 5 Patient will report returning to recreational activity of playing pool for 3x with 0/10 pain to return to leisure activities and PLOF.  -      LTG 5 Progress Ongoing  -SANTOSH     LTG 6 Patient will report returning to full duty for 2 weeks with </= 2/10 R elbow pain with all work related duties to demonstrate ability to return to work and PLOF.  -SANTOSH     LTG 6 Progress Ongoing  -SANTOSH           User Key  (r) = Recorded By, (t) = Taken By, (c) = Cosigned By    Initials Name Provider Type    Iveth Sun, PT Physical Therapist                               Time Calculation:   Start Time: 0746  Stop Time: 0830  Time Calculation (min): 44 min  Timed Charges  85795 - PT Therapeutic Exercise Minutes: 44  Total Minutes  Timed Charges Total Minutes: 44   Total Minutes: 44  Therapy Charges for Today     Code Description Service Date Service Provider Modifiers Qty    08208245631 HC PT THER PROC EA 15 MIN 1/3/2022 Iveth Miller, PT GP 3                    Iveth Miller, PT  1/3/2022

## 2022-01-06 ENCOUNTER — HOSPITAL ENCOUNTER (OUTPATIENT)
Dept: PHYSICAL THERAPY | Facility: HOSPITAL | Age: 27
Setting detail: THERAPIES SERIES
Discharge: HOME | End: 2022-01-06

## 2022-01-06 DIAGNOSIS — M25.621 DECREASED RANGE OF MOTION OF RIGHT ELBOW: ICD-10-CM

## 2022-01-06 DIAGNOSIS — R29.898 DECREASED GRIP STRENGTH OF RIGHT HAND: ICD-10-CM

## 2022-01-06 DIAGNOSIS — S52.124D CLOSED NONDISPLACED FRACTURE OF HEAD OF RIGHT RADIUS WITH ROUTINE HEALING, SUBSEQUENT ENCOUNTER: Primary | ICD-10-CM

## 2022-01-06 PROCEDURE — 97110 THERAPEUTIC EXERCISES: CPT

## 2022-01-06 NOTE — THERAPY TREATMENT NOTE
Outpatient Physical Therapy Ortho Treatment Note  [facility]     Patient Name: Arnold Mitchell  : 1995  MRN: [id number]  Today's Date: 2022      Visit Date: 2022    Visit Dx:    ICD-10-CM ICD-9-CM   1. Closed nondisplaced fracture of head of right radius with routine healing, subsequent encounter  S52.124D V54.12   2. Decreased range of motion of right elbow  M25.621 719.52   3. Decreased  strength of right hand  R29.898 729.89       There is no problem list on file for this patient.       No past medical history on file.     No past surgical history on file.                     PT Assessment/Plan     Row Name 22 1000          PT Assessment    Assessment Comments Mr. Mitchell returns today reporting return to light duty work with no issues. Saw MD earlier this week, was instructed to cont 2# weight restriction until the end of next week and then progressing to 5 pounds the week after. Progressed UE strengthening challenges today. Pt requires cueing for form, as well as fatigues quickly.  -CC            PT Plan    PT Plan Comments Cont 2# weight for another week, then progress to 5# as tolerated.  -CC           User Key  (r) = Recorded By, (t) = Taken By, (c) = Cosigned By    Initials Name Provider Type    Madhavi Hawthorne, PT Physical Therapist                   OP Exercises     Row Name 22 1000             Subjective Comments    Subjective Comments Saw MD, 2# wt restriction til end of next week; then 5# til then end of the month when next f/u is. Back at work, but light duty only  -CC              Subjective Pain    Able to rate subjective pain? yes  -CC      Pre-Treatment Pain Level 0  -CC              Total Minutes    05689 - PT Therapeutic Exercise Minutes 38  -CC              Exercise 1    Exercise Name 1 AROM elbow flexion/ext  -CC      Cueing 1 Verbal; Demo  -CC      Sets 1 3  -CC      Reps 1 10  -CC      Additional Comments 2#  -CC              Exercise 2    Exercise  Name 2 supine SA punch  -CC      Cueing 2 Verbal; Demo  -CC      Sets 2 2  -CC      Reps 2 10  -CC      Time 2 2#  -CC      Additional Comments ABC's after last rep  -CC              Exercise 3    Exercise Name 3 AROM wrist flex/ext  -CC      Cueing 3 Verbal; Demo  -CC      Sets 3 2  -CC      Reps 3 10  -CC      Additional Comments 2#  -CC              Exercise 4    Exercise Name 4 low row and tband shldr ext  -CC      Cueing 4 Verbal  -CC      Sets 4 2 ea  -CC      Reps 4 10  -CC      Time 4 GTB  -CC              Exercise 5    Exercise Name 5 White gripper squeeze  -CC      Cueing 5 Verbal; Tactile  -CC      Sets 5 2  -CC      Reps 5 10 ea  -CC      Time 5 5 s hold  -CC      Additional Comments full extension, and with elbow flexed at 90  -CC              Exercise 6    Exercise Name 6 D2 shldr flex  -CC      Cueing 6 Verbal; Tactile; Demo  -CC      Sets 6 2  -CC      Reps 6 10  -CC      Time 6 YTB  -CC      Additional Comments much cueing for form and to keep elbow extended throughout  -CC              Exercise 7    Exercise Name 7 body blade, full extension and elbow at 90 deg, and moving from neutral shldr to 90 deg shldr flex with elbow in ext throughout  -CC      Cueing 7 Verbal; Demo  -CC      Sets 7 3 sets each  -CC      Reps 7 15 sec  -CC              Exercise 8    Exercise Name 8 Green flex bar  -CC      Cueing 8 Verbal; Demo  -CC      Reps 8 20 each  -CC      Time 8 10reps 90/90, 10 reps extended  -CC      Additional Comments Bending into U, upside down U, and wringing both directions  -CC              Exercise 9    Exercise Name 9 shoulder flexion with palm up, down, and thumb up with neutral wrist  -CC      Cueing 9 Verbal; Demo  -CC      Sets 9 2  -CC      Reps 9 10 ea  -CC      Time 9 2#  -CC              Exercise 10    Exercise Name 10 UBE  -CC      Cueing 10 Verbal  -CC      Time 10 3 minutes forward, 3 minutes retro  -CC      Additional Comments L2  -CC              Exercise 11    Exercise Name 11  Shoulder : shldrs flexed to 90 deg with theraloop around hands, rotating through shoulder IR/ER simultaneous, like driving a steering wheel  -CC      Cueing 11 Verbal; Demo  -CC      Sets 11 3  -CC      Reps 11 10  -CC      Time 11 YTB in theraloop  -CC              Exercise 12    Exercise Name 12 mallet sup/pro/UD/RD  -CC      Cueing 12 Verbal; Demo  -CC      Reps 12 20  -CC      Time 12 3s  -CC              Exercise 13    Exercise Name 13 supine skull   -CC      Cueing 13 Verbal; Demo  -CC      Sets 13 2  -CC      Reps 13 10  -CC      Additional Comments 2#  -CC            User Key  (r) = Recorded By, (t) = Taken By, (c) = Cosigned By    Initials Name Provider Type    CC Madhavi Downey, PT Physical Therapist                                                Time Calculation:   Start Time: 0959  Stop Time: 1037  Time Calculation (min): 38 min  Total Timed Code Minutes- PT: 38 minute(s)  Timed Charges  05977 - PT Therapeutic Exercise Minutes: 38  Total Minutes  Timed Charges Total Minutes: 38   Total Minutes: 38  Therapy Charges for Today     Code Description Service Date Service Provider Modifiers Qty    93655867363 HC PT THER PROC EA 15 MIN 1/6/2022 Madhavi Downey, PT GP 3                    Madhavi Downey PT  1/6/2022

## 2022-01-10 ENCOUNTER — HOSPITAL ENCOUNTER (OUTPATIENT)
Dept: PHYSICAL THERAPY | Facility: HOSPITAL | Age: 27
Setting detail: THERAPIES SERIES
Discharge: HOME | End: 2022-01-10

## 2022-01-10 DIAGNOSIS — R29.898 DECREASED GRIP STRENGTH OF RIGHT HAND: ICD-10-CM

## 2022-01-10 DIAGNOSIS — M25.621 DECREASED RANGE OF MOTION OF RIGHT ELBOW: ICD-10-CM

## 2022-01-10 DIAGNOSIS — S52.124D CLOSED NONDISPLACED FRACTURE OF HEAD OF RIGHT RADIUS WITH ROUTINE HEALING, SUBSEQUENT ENCOUNTER: Primary | ICD-10-CM

## 2022-01-10 PROCEDURE — 97110 THERAPEUTIC EXERCISES: CPT

## 2022-01-10 NOTE — THERAPY TREATMENT NOTE
Outpatient Physical Therapy Ortho Treatment Note  [facility]     Patient Name: Arnold Mitchell  : 1995  MRN: [id number]  Today's Date: 1/10/2022      Visit Date: 01/10/2022    Visit Dx:    ICD-10-CM ICD-9-CM   1. Closed nondisplaced fracture of head of right radius with routine healing, subsequent encounter  S52.124D V54.12   2. Decreased range of motion of right elbow  M25.621 719.52   3. Decreased  strength of right hand  R29.898 729.89       There is no problem list on file for this patient.       No past medical history on file.     No past surgical history on file.                     PT Assessment/Plan     Row Name 01/10/22 1043          PT Assessment    Assessment Comments Mr. Mitchell is 7 weeks post radial head fx and continues to work on gentle strengthening and ROM per 2 # limit.  He can increase to 5 # next week.  He reports no pain with any activity, but has some mild shoulder soreness, and required cuing to correct standing posture.  -LP     Please refer to paper survey for additional self-reported information Yes  -LP     Rehab Potential Excellent  -LP     Patient/caregiver participated in establishment of treatment plan and goals Yes  -LP     Patient would benefit from skilled therapy intervention Yes  -LP            PT Plan    PT Frequency 2x/week  -LP     PT Plan Comments continue per POC  -LP           User Key  (r) = Recorded By, (t) = Taken By, (c) = Cosigned By    Initials Name Provider Type    LP Catia Chrisitna, PT Physical Therapist                   OP Exercises     Row Name 01/10/22 1000             Subjective Comments    Subjective Comments No new complaints. Back to work but not lifting anything  -LP              Subjective Pain    Able to rate subjective pain? yes  -LP      Pre-Treatment Pain Level 0  -LP              Total Minutes    39151 - PT Therapeutic Exercise Minutes 40  -LP              Exercise 1    Exercise Name 1 AROM elbow flexion/ext  -LP      Cueing 1 Verbal   -LP      Sets 1 3  -LP      Reps 1 10  -LP      Additional Comments 2#  -LP              Exercise 2    Exercise Name 2 supine SA punch  -LP      Cueing 2 Verbal; Demo  -LP      Sets 2 2  -LP      Reps 2 10  -LP      Time 2 2#  -LP              Exercise 3    Exercise Name 3 AROM wrist flex/ext  -LP      Cueing 3 Verbal; Demo  -LP      Sets 3 2  -LP      Reps 3 10  -LP      Additional Comments 2#  -LP              Exercise 4    Exercise Name 4 low row and tband shldr ext  -LP      Cueing 4 Verbal; Demo  -LP      Sets 4 2  -LP      Reps 4 10  -LP      Additional Comments GTB  -LP              Exercise 5    Exercise Name 5 White gripper squeeze  -LP      Cueing 5 Verbal; Tactile  -LP      Sets 5 1  -LP      Reps 5 15  -LP      Time 5 5s  -LP      Additional Comments elbow bent  -LP              Exercise 6    Exercise Name 6 D2 shldr flex  -LP      Cueing 6 Verbal; Tactile; Demo  -LP      Sets 6 2  -LP      Reps 6 10  -LP      Additional Comments YTB- gave Tband to do at home  -LP              Exercise 7    Exercise Name 7 body blade, full extension and elbow at 90 deg, and moving from neutral shldr to 90 deg shldr flex with elbow in ext throughout  -LP      Cueing 7 Verbal; Demo  -LP      Sets 7 3 sets each  -LP      Additional Comments working to 30 sec  -LP              Exercise 8    Exercise Name 8 Green flex bar  -LP      Cueing 8 Verbal; Demo  -LP      Reps 8 20 each  -LP      Additional Comments U, upside down U and twisting  -LP              Exercise 9    Exercise Name 9 shoulder flexion with palm up, down, and thumb up with neutral wrist  -LP      Cueing 9 Verbal; Demo  -LP      Sets 9 2  -LP      Reps 9 10ea  -LP      Additional Comments 2#  -LP              Exercise 10    Exercise Name 10 UBE  -LP      Cueing 10 Verbal  -LP      Time 10 3 minutes forward, 3 minutes retro  -LP      Additional Comments L2  -LP              Exercise 11    Exercise Name 11 Shoulder : shldrs flexed to 90 deg with theraloop  around hands, rotating through shoulder IR/ER simultaneous, like driving a steering wheel  -LP      Cueing 11 Verbal; Demo  -LP      Sets 11 3  -LP      Reps 11 10  -LP      Additional Comments YTB  -LP              Exercise 12    Exercise Name 12 mallet sup/pro/UD/RD  -LP      Cueing 12 Verbal; Demo  -LP      Reps 12 20  -LP      Time 12 3s  -LP              Exercise 13    Exercise Name 13 supine skull   -LP      Cueing 13 Verbal; Demo  -LP      Sets 13 2  -LP      Reps 13 10  -LP      Additional Comments 2#  -LP              Exercise 14    Exercise Name 14 standing scap retraction  -LP      Additional Comments encouraged to do at home and work.  -LP            User Key  (r) = Recorded By, (t) = Taken By, (c) = Cosigned By    Initials Name Provider Type    Catia Ríos, PT Physical Therapist                              PT OP Goals     Row Name 01/10/22 1000          PT Short Term Goals    STG 1 Pt will be independent and compliant with initial HEP for long term management of symptoms and prevention of future occurrence.  -LP     STG 1 Progress Met  -LP     STG 2 Pt will report pain rated 3/10 at worst in order to demonstrate ability to return to normalized ADLs, functional activities, and light duty work related activities.  -LP     STG 2 Progress Met  -LP     STG 2 Progress Comments pt reporting no pain last few visits  -LP     STG 3 Pt will demonstrate R elbow ROM ext=5 deg in order to improve ability to perform ADLs.  -LP     STG 3 Progress Met  -LP            Long Term Goals    LTG 1 Pt will be independent and compliant with advanced HEP for long term management of symptoms and prevention of future occurrence.  -LP     LTG 1 Progress Progressing  -LP     LTG 1 Progress Comments Pt doing well with new exercises  -LP     LTG 2 Pt will demonstrate R elbow ROM 0-140 in order to improve ability to perform ADLs and participate and functional related tasks.  -LP     LTG 2 Progress Partially Met  -LP      LTG 2 Progress Comments flex - 140, ext, a few degrees from 0  -LP     LTG 3 Pt will demonstrate R  strength to 50# in order to increase ability to perform fine motor tasks including gripping and writing while participating in work related duties.  -LP     LTG 3 Progress Ongoing  -LP     LTG 4 Pt will reduce level of perceived disability as measured by the DASH  to 25% in order to improve QOL.  -LP     LTG 4 Progress Ongoing  -LP     LTG 5 Patient will report returning to recreational activity of playing pool for 3x with 0/10 pain to return to leisure activities and PLOF.  -LP     LTG 5 Progress Ongoing  -LP     LTG 6 Patient will report returning to full duty for 2 weeks with </= 2/10 R elbow pain with all work related duties to demonstrate ability to return to work and PLOF.  -LP     LTG 6 Progress Ongoing  -LP           User Key  (r) = Recorded By, (t) = Taken By, (c) = Cosigned By    Initials Name Provider Type    LP Catia Christina, PT Physical Therapist                Therapy Education  Education Details: Being aware of posture while at work  Given: HEP, Posture/body mechanics, Symptoms/condition management  Program: Reinforced, New  How Provided: Verbal, Demonstration  Provided to: Patient  Level of Understanding: Teach back education performed              Time Calculation:   Start Time: 1000  Stop Time: 1040  Time Calculation (min): 40 min  Timed Charges  65250 - PT Therapeutic Exercise Minutes: 40  Total Minutes  Timed Charges Total Minutes: 40   Total Minutes: 40  Therapy Charges for Today     Code Description Service Date Service Provider Modifiers Qty    48452771274 HC PT THER PROC EA 15 MIN 1/10/2022 Catia Christina, PT GP 3                    Catia Christina PT  1/10/2022

## 2022-01-13 ENCOUNTER — HOSPITAL ENCOUNTER (OUTPATIENT)
Dept: PHYSICAL THERAPY | Facility: HOSPITAL | Age: 27
Setting detail: THERAPIES SERIES
Discharge: HOME | End: 2022-01-13

## 2022-01-13 DIAGNOSIS — S52.124D CLOSED NONDISPLACED FRACTURE OF HEAD OF RIGHT RADIUS WITH ROUTINE HEALING, SUBSEQUENT ENCOUNTER: Primary | ICD-10-CM

## 2022-01-13 DIAGNOSIS — R29.898 DECREASED GRIP STRENGTH OF RIGHT HAND: ICD-10-CM

## 2022-01-13 DIAGNOSIS — M25.621 DECREASED RANGE OF MOTION OF RIGHT ELBOW: ICD-10-CM

## 2022-01-13 PROCEDURE — 97110 THERAPEUTIC EXERCISES: CPT | Performed by: PHYSICAL THERAPIST

## 2022-01-13 NOTE — THERAPY TREATMENT NOTE
Outpatient Physical Therapy Ortho Treatment Note  [facility]     Patient Name: Arnold Mitchell  : 1995  MRN: [id number]  Today's Date: 2022      Visit Date: 2022    Visit Dx:    ICD-10-CM ICD-9-CM   1. Closed nondisplaced fracture of head of right radius with routine healing, subsequent encounter  S52.124D V54.12   2. Decreased range of motion of right elbow  M25.621 719.52   3. Decreased  strength of right hand  R29.898 729.89       There is no problem list on file for this patient.       No past medical history on file.     No past surgical history on file.                     PT Assessment/Plan     Row Name 22 1000          PT Assessment    Assessment Comments Pt tolerated progression of lifting, pushing, and pulling without pain or discomfort. Pt reports increased load results in greater ease with functional reaching, pushing, pulling, along with other ADLs and occupatoinal demands. Strengthening continues with shoulder and wrist secondary to regeion interdependencec and overall strength and development of R UE chain. Pt remains appropriate for skilled PT and progression per protocol .Pt continues on 2Lb lifting restriction with work.  -GT            PT Plan    PT Plan Comments continue per POC, protocol  -GT           User Key  (r) = Recorded By, (t) = Taken By, (c) = Cosigned By    Initials Name Provider Type    GT Pritesh Dumont, PT Physical Therapist                   OP Exercises     Row Name 22 1000             Subjective Comments    Subjective Comments Pt reports return to work with 2lbs limit.  Pt continues without complaints.  -GT              Subjective Pain    Able to rate subjective pain? yes  -GT      Pre-Treatment Pain Level 0  -GT              Total Minutes    98079 - PT Therapeutic Exercise Minutes 41  -GT              Exercise 1    Exercise Name 1 AROM elbow flexion/ext  -GT      Cueing 1 Verbal  -GT      Sets 1 3  -GT      Reps 1 10  -GT      Additional  Comments 3#  -GT              Exercise 2    Exercise Name 2 supine SA punch  -GT      Cueing 2 Verbal; Demo  -GT      Sets 2 2  -GT      Reps 2 10  -GT      Time 2 2#  -GT              Exercise 3    Exercise Name 3 AROM wrist flex/ext  -GT      Cueing 3 Verbal; Demo  -GT      Sets 3 2  -GT      Reps 3 10  -GT      Additional Comments 4#  -GT              Exercise 4    Exercise Name 4 low row and tband shldr ext  -GT      Cueing 4 Verbal; Demo  -GT      Sets 4 2  -GT      Reps 4 10  -GT      Additional Comments BTB  -GT              Exercise 5    Exercise Name 5 White gripper squeeze  -GT      Cueing 5 Verbal; Tactile  -GT      Sets 5 1  -GT      Reps 5 15  -GT      Time 5 5s  -GT              Exercise 6    Exercise Name 6 D2 shldr flex  -GT      Cueing 6 Verbal; Tactile; Demo  -GT      Sets 6 2  -GT      Reps 6 10  -GT      Additional Comments 3 # DB  -GT              Exercise 7    Exercise Name 7 body blade, full extension and elbow at 90 deg, and moving from neutral shldr to 90 deg shldr flex with elbow in ext throughout  -GT      Cueing 7 Verbal; Demo  -GT      Sets 7 3 sets each  -GT      Additional Comments working to 30 sec  -GT              Exercise 8    Exercise Name 8 Green flex bar  -GT      Cueing 8 Verbal; Demo  -GT      Reps 8 20 each  -GT      Additional Comments U, upside down U and twisting  -GT              Exercise 9    Exercise Name 9 shoulder flexion with palm up, down, and thumb up with neutral wrist  -GT      Cueing 9 Verbal; Demo  -GT      Sets 9 2  -GT      Reps 9 10ea  -GT      Additional Comments 3#  -GT              Exercise 10    Exercise Name 10 UBE  -GT      Cueing 10 Verbal  -GT      Time 10 3 minutes forward, 3 minutes retro  -GT      Additional Comments L3  -GT              Exercise 11    Exercise Name 11 Shoulder : shldrs flexed to 90 deg with theraloop around hands, rotating through shoulder IR/ER simultaneous, like driving a steering wheel  -GT      Cueing 11 Verbal; Demo   -GT      Sets 11 3  -GT      Reps 11 10  -GT              Exercise 12    Exercise Name 12 mallet sup/pro/UD/RD  -GT      Cueing 12 Verbal; Demo  -GT      Reps 12 20  -GT      Time 12 3s  -GT              Exercise 13    Exercise Name 13 supine skull   -GT      Cueing 13 Verbal; Demo  -GT      Sets 13 2  -GT      Reps 13 10  -GT              Exercise 14    Exercise Name 14 4 way wrist  -GT      Cueing 14 Verbal; Demo  -GT      Reps 14 10 ea  -GT      Additional Comments 4 #  -GT            User Key  (r) = Recorded By, (t) = Taken By, (c) = Cosigned By    Initials Name Provider Type    Pritesh Calderon, PT Physical Therapist                              PT OP Goals     Row Name 01/13/22 1000          PT Short Term Goals    STG 1 Pt will be independent and compliant with initial HEP for long term management of symptoms and prevention of future occurrence.  -GT     STG 1 Progress Met  -GT     STG 2 Pt will report pain rated 3/10 at worst in order to demonstrate ability to return to normalized ADLs, functional activities, and light duty work related activities.  -GT     STG 2 Progress Met  -GT     STG 3 Pt will demonstrate R elbow ROM ext=5 deg in order to improve ability to perform ADLs.  -GT     STG 3 Progress Met  -GT            Long Term Goals    LTG 1 Pt will be independent and compliant with advanced HEP for long term management of symptoms and prevention of future occurrence.  -GT     LTG 1 Progress Progressing  -GT     LTG 2 Pt will demonstrate R elbow ROM 0-140 in order to improve ability to perform ADLs and participate and functional related tasks.  -GT     LTG 2 Progress Partially Met  -GT     LTG 3 Pt will demonstrate R  strength to 50# in order to increase ability to perform fine motor tasks including gripping and writing while participating in work related duties.  -GT     LTG 3 Progress Ongoing  -GT     LTG 4 Pt will reduce level of perceived disability as measured by the DASH  to 25% in order to  improve QOL.  -GT     LTG 4 Progress Ongoing  -GT     LTG 5 Patient will report returning to recreational activity of playing pool for 3x with 0/10 pain to return to leisure activities and PLOF.  -GT     LTG 5 Progress Ongoing  -GT     LTG 6 Patient will report returning to full duty for 2 weeks with </= 2/10 R elbow pain with all work related duties to demonstrate ability to return to work and PLOF.  -GT     LTG 6 Progress Ongoing  -GT           User Key  (r) = Recorded By, (t) = Taken By, (c) = Cosigned By    Initials Name Provider Type    Pritesh Calderon, PT Physical Therapist                Therapy Education  Education Details: progress lifting  Given: HEP, Posture/body mechanics, Symptoms/condition management  Program: Reinforced  How Provided: Verbal, Demonstration  Provided to: Patient  Level of Understanding: Teach back education performed              Time Calculation:   Start Time: 1000  Stop Time: 1041  Time Calculation (min): 41 min  Timed Charges  95384 - PT Therapeutic Exercise Minutes: 41  Total Minutes  Timed Charges Total Minutes: 41   Total Minutes: 41  Therapy Charges for Today     Code Description Service Date Service Provider Modifiers Qty    [id number]  PT THER PROC EA 15 MIN 1/13/2022 Pritesh Dumont, PT GP 3                    Pritesh Dumont PT  1/13/2022

## 2022-01-17 ENCOUNTER — HOSPITAL ENCOUNTER (OUTPATIENT)
Dept: PHYSICAL THERAPY | Facility: HOSPITAL | Age: 27
Setting detail: THERAPIES SERIES
Discharge: HOME | End: 2022-01-17

## 2022-01-17 DIAGNOSIS — S52.124D CLOSED NONDISPLACED FRACTURE OF HEAD OF RIGHT RADIUS WITH ROUTINE HEALING, SUBSEQUENT ENCOUNTER: Primary | ICD-10-CM

## 2022-01-17 DIAGNOSIS — R29.898 DECREASED GRIP STRENGTH OF RIGHT HAND: ICD-10-CM

## 2022-01-17 DIAGNOSIS — M25.621 DECREASED RANGE OF MOTION OF RIGHT ELBOW: ICD-10-CM

## 2022-01-17 PROCEDURE — 97110 THERAPEUTIC EXERCISES: CPT

## 2022-01-17 NOTE — THERAPY TREATMENT NOTE
Outpatient Physical Therapy Ortho Treatment Note  [facility]     Patient Name: Arnold Mitchell  : 1995  MRN: [id number]  Today's Date: 2022      Visit Date: 2022    Visit Dx:    ICD-10-CM ICD-9-CM   1. Closed nondisplaced fracture of head of right radius with routine healing, subsequent encounter  S52.124D V54.12   2. Decreased range of motion of right elbow  M25.621 719.52   3. Decreased  strength of right hand  R29.898 729.89       There is no problem list on file for this patient.       No past medical history on file.     No past surgical history on file.                     PT Assessment/Plan     Row Name 22 1100          PT Assessment    Assessment Comments Arnold continues to progress towards goals and able to increase lifting restriction to 3lbs per MD protocol. Educated patient on redistricting changes and he verbalized understanding. He continues to express minimal to no pain associated with R radial head and complains of R shoulder fatigue. He tolerated progression within multiple interventions and addition of D2 flex/ext body blade. Due to patients consistent improvement in function/strength he would benefit from reducing frequency to 1x/wk beginning next week.  -SANTOSH            PT Plan    PT Plan Comments Print schedule. Continue to increase per protocol. Patient lifting restriction can increase to 10lbs over next 2-3 weeks  -SANTOSH           User Key  (r) = Recorded By, (t) = Taken By, (c) = Cosigned By    Initials Name Provider Type    Iveth Sun, PT Physical Therapist                   OP Exercises     Row Name 22 1000             Subjective Comments    Subjective Comments No new complaints  -SANTOSH              Subjective Pain    Able to rate subjective pain? yes  -SANTOSH      Pre-Treatment Pain Level 0  -SANTOSH              Total Minutes    89275 - PT Therapeutic Exercise Minutes 47  -SANTOSH              Exercise 1    Exercise Name 1 AROM elbow flexion/ext  -SANTOSH       Cueing 1 Verbal  -SANTOSH      Sets 1 3  -SANTOSH      Reps 1 10  -SANTOSH      Additional Comments 4#  -SANTOSH              Exercise 2    Exercise Name 2 supine SA punch  -SANTOSH      Cueing 2 Verbal; Demo  -SANTOSH      Sets 2 2  -SANTOSH      Reps 2 10  -SANTOSH      Time 2 3#  -SANTOSH              Exercise 3    Exercise Name 3 AROM wrist flex/ext  -SANTOSH      Cueing 3 Verbal; Demo  -SANTOSH      Sets 3 2  -SANTOSH      Reps 3 10  -SANTOSH      Additional Comments 4#  -SANTOSH              Exercise 4    Exercise Name 4 low row and tband shldr ext  -SANTOSH      Cueing 4 Verbal; Demo  -SANTOSH      Sets 4 2  -SANTOSH      Reps 4 10  -SANTOSH      Additional Comments BTB  -SANTOSH              Exercise 5    Exercise Name 5 White gripper squeeze  -SANTOSH      Cueing 5 Verbal; Tactile  -SANTOSH      Sets 5 1  -SANTOSH      Reps 5 15  -SANTOSH      Time 5 5s  -SANTOSH      Additional Comments elbow bent  -SANTOSH              Exercise 6    Exercise Name 6 D2 shldr flex  -SANTOSH      Cueing 6 Verbal; Tactile; Demo  -SANTOSH      Sets 6 2  -SANTOSH      Reps 6 10  -SANTOSH      Additional Comments 3 # DB  -SANTOSH              Exercise 7    Exercise Name 7 body blade, full extension and elbow at 90 deg, and moving from neutral shldr to 90 deg shldr flex with elbow in ext throughout, also D2 flex/ext  -SANTOSH      Cueing 7 Verbal; Demo  -SANTOSH      Sets 7 3 sets each  -SANTOSH              Exercise 8    Exercise Name 8 Green flex bar  -SANTOSH      Cueing 8 Verbal; Demo  -SANTOSH      Reps 8 20 each  -SANTOSH      Additional Comments U, upside down U and twisting  -SANTOSH              Exercise 9    Exercise Name 9 shoulder flexion with palm up, down, and thumb up with neutral wrist  -SANTOSH      Cueing 9 Verbal; Demo  -SANTOSH      Sets 9 2  -SANTOSH      Reps 9 10ea  -SANTOSH      Additional Comments 3#  -SANTOSH              Exercise 10    Exercise Name 10 UBE  -SANTOSH      Cueing 10 Verbal  -SANTOSH      Time 10 3 minutes forward, 3 minutes retro  -SANTOSH      Additional Comments L3  -SANTOSH              Exercise 11    Exercise Name 11 Shoulder : shldrs flexed to 90 deg with theraloop around hands, rotating through  shoulder IR/ER simultaneous, like driving a steering wheel  -SANTOSH      Cueing 11 Verbal; Demo  -SANTOSH      Sets 11 3  -SANTOSH      Reps 11 10  -SANTOSH      Additional Comments YTB  -SANTOSH              Exercise 12    Exercise Name 12 mallet sup/pro/UD/RD  -SANTOSH      Cueing 12 Verbal; Demo  -SANTOSH      Reps 12 20  -SANTOSH      Time 12 3s  -SANTOSH              Exercise 13    Exercise Name 13 tricep press down  -SANTOSH      Cueing 13 Verbal; Demo  -SANTOSH      Sets 13 2  -SANTOSH      Reps 13 10  -SANTOSH      Additional Comments GTB  -SANTOSH              Exercise 14    Exercise Name 14 --  -SANTOSH      Cueing 14 --  -SANTOSH      Reps 14 --  -SANTOSH              Exercise 15    Exercise Name 15 horizontal shoulder abd/add  -SANTOSH      Cueing 15 Verbal; Demo  -SANTOSH      Sets 15 2  -SANTOSH      Reps 15 10  -SANTOSH      Time 15 thumb up/palm up  -SANTOSH      Additional Comments RTB  -SANTOSH            User Key  (r) = Recorded By, (t) = Taken By, (c) = Cosigned By    Initials Name Provider Type    Iveth Sun, PT Physical Therapist                              PT OP Goals     Row Name 01/17/22 1000          PT Short Term Goals    STG 1 Pt will be independent and compliant with initial HEP for long term management of symptoms and prevention of future occurrence.  -SANTOSH     STG 1 Progress Met  -SANTOSH     STG 2 Pt will report pain rated 3/10 at worst in order to demonstrate ability to return to normalized ADLs, functional activities, and light duty work related activities.  -     STG 2 Progress Met  -SANTOSH     STG 3 Pt will demonstrate R elbow ROM ext=5 deg in order to improve ability to perform ADLs.  -     STG 3 Progress Met  -SANTOSH            Long Term Goals    LTG 1 Pt will be independent and compliant with advanced HEP for long term management of symptoms and prevention of future occurrence.  -SANTOSH     LTG 1 Progress Progressing  -SANTOSH     LTG 2 Pt will demonstrate R elbow ROM 0-140 in order to improve ability to perform ADLs and participate and functional related tasks.  -     LTG 2 Progress  Partially Met  -     LTG 3 Pt will demonstrate R  strength to 50# in order to increase ability to perform fine motor tasks including gripping and writing while participating in work related duties.  -     LTG 3 Progress Ongoing  -     LTG 4 Pt will reduce level of perceived disability as measured by the DASH  to 25% in order to improve QOL.  -     LTG 4 Progress Ongoing  -     LTG 5 Patient will report returning to recreational activity of playing pool for 3x with 0/10 pain to return to leisure activities and PLOF.  -     LTG 5 Progress Ongoing  -     LTG 6 Patient will report returning to full duty for 2 weeks with </= 2/10 R elbow pain with all work related duties to demonstrate ability to return to work and PLOF.  -[facility] 6 Progress Ongoing  -           User Key  (r) = Recorded By, (t) = Taken By, (c) = Cosigned By    Initials Name Provider Type    Iveth Sun, PT Physical Therapist                Therapy Education  Education Details: progress lifting to 3lbs              Time Calculation:   Start Time: 1001  Stop Time: 1048  Time Calculation (min): 47 min  Timed Charges  52433 - PT Therapeutic Exercise Minutes: 47  Total Minutes  Timed Charges Total Minutes: 47   Total Minutes: 47  Therapy Charges for Today     Code Description Service Date Service Provider Modifiers Qty    [id number]  PT THER PROC EA 15 MIN 1/17/2022 Iveth Miller, PT GP 3                    Iveth Miller, PT  1/17/2022

## 2022-01-20 ENCOUNTER — HOSPITAL ENCOUNTER (OUTPATIENT)
Dept: PHYSICAL THERAPY | Facility: HOSPITAL | Age: 27
Setting detail: THERAPIES SERIES
Discharge: HOME | End: 2022-01-20

## 2022-01-20 DIAGNOSIS — S52.124D CLOSED NONDISPLACED FRACTURE OF HEAD OF RIGHT RADIUS WITH ROUTINE HEALING, SUBSEQUENT ENCOUNTER: Primary | ICD-10-CM

## 2022-01-20 DIAGNOSIS — R29.898 DECREASED GRIP STRENGTH OF RIGHT HAND: ICD-10-CM

## 2022-01-20 DIAGNOSIS — M25.621 DECREASED RANGE OF MOTION OF RIGHT ELBOW: ICD-10-CM

## 2022-01-20 PROCEDURE — 97110 THERAPEUTIC EXERCISES: CPT | Performed by: PHYSICAL THERAPIST

## 2022-01-20 NOTE — THERAPY PROGRESS REPORT/RE-CERT
Outpatient Physical Therapy Ortho Progress Note  [facility]     Patient Name: Arnold Mitchell  : 1995  MRN: [id number]  Today's Date: 2022      Visit Date: 2022    Visit Dx:    ICD-10-CM ICD-9-CM   1. Closed nondisplaced fracture of head of right radius with routine healing, subsequent encounter  S52.124D V54.12   2. Decreased range of motion of right elbow  M25.621 719.52   3. Decreased  strength of right hand  R29.898 729.89       There is no problem list on file for this patient.       No past medical history on file.     No past surgical history on file.                     PT Assessment/Plan     Row Name 22 1200          PT Assessment    Assessment Comments Arnold Mitchell has been seen for 10 physical therapy sessions for closed non displaced fracture of head of right radius with routine healing that occurred on 21 .  Treatment has included therapeutic exercise, manual therapy, therapeutic activity, neuro-muscular retraining  and patient education with home exercise program . Progress to physical therapy goals is excellent. Pt has met 3/3 STG and 4/6 LTG. Pt continues to be progressed to lifting no more than 10 lbs. Pt is able to tolerate repetitive pushing, pulling, and lifting tasks in all planes. Pt reports with more noticeable shoulder and wrist soreness following exercise. Pt reports with full R elbow ROM, 4+/5 strength, and symmetrical  strength of 65 lbs at R UE vs L UE. He will benefit from continued skilled physical therapy to address remaining impairments and functional limitations. Pt potential d/c following clearance from  At follow up on 2022.  -GT            PT Plan    PT Plan Comments can increase to 10lbs over next 2-3 weeks, pt potential d/c following release from MD on 22, continues work related liftingup to 10 lbs  -GT           User Key  (r) = Recorded By, (t) = Taken By, (c) = Cosigned By    Initials Name Provider Type    GT Pritesh Dumont, PT  Physical Therapist                   OP Exercises     Row Name 01/20/22 1100             Subjective Comments    Subjective Comments Feeling good.  -GT              Subjective Pain    Able to rate subjective pain? yes  -GT      Pre-Treatment Pain Level 0  -GT              Total Minutes    14972 - PT Therapeutic Exercise Minutes 43  -GT              Exercise 1    Exercise Name 1 AROM elbow flexion/ext  -GT      Cueing 1 Verbal  -GT      Sets 1 3  -GT      Reps 1 10  -GT      Additional Comments 5#  -GT              Exercise 2    Exercise Name 2 supine SA punch  -GT      Cueing 2 Verbal; Demo  -GT      Sets 2 2  -GT      Reps 2 10  -GT      Time 2 5#  -GT              Exercise 3    Exercise Name 3 AROM wrist flex/ext  -GT      Cueing 3 Verbal; Demo  -GT      Sets 3 2  -GT      Reps 3 10  -GT      Additional Comments 5#  -GT              Exercise 4    Exercise Name 4 low row and tband shldr ext  -GT      Cueing 4 Verbal; Demo  -GT      Sets 4 2  -GT      Reps 4 10  -GT      Additional Comments BTB  -GT              Exercise 5    Exercise Name 5 White gripper squeeze  -GT      Cueing 5 Verbal; Tactile  -GT      Sets 5 1  -GT      Reps 5 15  -GT      Time 5 5s  -GT              Exercise 6    Exercise Name 6 D2 shldr flex  -GT      Cueing 6 Verbal; Tactile; Demo  -GT      Sets 6 2  -GT      Reps 6 10  -GT      Additional Comments 4#  -GT              Exercise 7    Exercise Name 7 --  -GT      Cueing 7 --  -GT      Sets 7 --  -GT              Exercise 8    Exercise Name 8 Green flex bar  -GT      Cueing 8 Verbal; Demo  -GT      Reps 8 20 each  -GT      Additional Comments U, upside down U and twisting  -GT              Exercise 9    Exercise Name 9 shoulder flexion with palm up, down, and thumb up with neutral wrist  -GT      Cueing 9 Verbal; Demo  -GT      Sets 9 2  -GT      Reps 9 10ea  -GT      Additional Comments 5#  -GT              Exercise 10    Exercise Name 10 UBE  -GT      Cueing 10 Verbal  -GT      Time 10 3  minutes forward, 3 minutes retro  -GT      Additional Comments nL3  -GT              Exercise 11    Exercise Name 11 Shoulder : shldrs flexed to 90 deg with theraloop around hands, rotating through shoulder IR/ER simultaneous, like driving a steering wheel  -GT      Cueing 11 Verbal; Demo  -GT      Sets 11 3  -GT      Reps 11 10  -GT      Additional Comments YTB  -GT              Exercise 12    Exercise Name 12 mallet sup/pro/UD/RD  -GT      Cueing 12 Verbal; Demo  -GT      Reps 12 20  -GT      Time 12 3s  -GT      Additional Comments used 5 lb wegiht  -GT              Exercise 13    Exercise Name 13 tricep press down  -GT      Cueing 13 Verbal; Demo  -GT      Sets 13 2  -GT      Reps 13 10  -GT      Additional Comments 6lbs  -GT              Exercise 14    Exercise Name 14 DB overhead press  -GT      Cueing 14 Verbal; Demo  -GT      Sets 14 2  -GT      Reps 14 10  -GT      Additional Comments 5#  -GT              Exercise 15    Exercise Name 15 horizontal shoulder abd/add  -GT      Cueing 15 Verbal; Demo  -GT      Sets 15 2  -GT      Reps 15 10  -GT      Time 15 thumb up/palm up  -GT            User Key  (r) = Recorded By, (t) = Taken By, (c) = Cosigned By    Initials Name Provider Type    GT Pritesh Dumont, PT Physical Therapist                              PT OP Goals     Row Name 01/20/22 1100          PT Short Term Goals    STG 1 Pt will be independent and compliant with initial HEP for long term management of symptoms and prevention of future occurrence.  -GT     STG 1 Progress Met  -GT     STG 2 Pt will report pain rated 3/10 at worst in order to demonstrate ability to return to normalized ADLs, functional activities, and light duty work related activities.  -GT     STG 2 Progress Met  -GT     STG 3 Pt will demonstrate R elbow ROM ext=5 deg in order to improve ability to perform ADLs.  -GT     STG 3 Progress Met  -GT            Long Term Goals    LTG 1 Pt will be independent and compliant with advanced  HEP for long term management of symptoms and prevention of future occurrence.  -GT     LTG 1 Progress Met  -GT     LTG 2 Pt will demonstrate R elbow ROM 0-140 in order to improve ability to perform ADLs and participate and functional related tasks.  -GT     LTG 2 Progress Met  -GT     LTG 3 Pt will demonstrate R  strength to 50# in order to increase ability to perform fine motor tasks including gripping and writing while participating in work related duties.  -GT     LTG 3 Progress Met  -GT     LTG 3 Progress Comments 65  -GT     LTG 4 Pt will reduce level of perceived disability as measured by the DASH  to 25% in order to improve QOL.  -GT     LTG 4 Progress Met  -GT     LTG 4 Progress Comments 11  -GT     LTG 5 Patient will report returning to recreational activity of playing pool for 3x with 0/10 pain to return to leisure activities and PLOF.  -GT     LTG 5 Progress Ongoing  -GT     LTG 6 Patient will report returning to full duty for 2 weeks with </= 2/10 R elbow pain with all work related duties to demonstrate ability to return to work and PLOF.  -GT     LTG 6 Progress Ongoing  -GT           User Key  (r) = Recorded By, (t) = Taken By, (c) = Cosigned By    Initials Name Provider Type    Pritesh Calderon, PT Physical Therapist                Therapy Education  Education Details: progress lifting  up to 10 lbs    Outcome Measure Options: Quick DASH  Quick DASH  Open a tight or new jar.: No Difficulty  Do heavy household chores (e.g., wash walls, wash floors): Mild Difficulty  Carry a shopping bag or briefcase: Mild Difficulty  Wash your back: No Difficulty  Use a knife to cut food: No Difficulty  Recreational activities in which you take some force or impact through your arm, should or hand (e.g. golf, hammering, tennis, etc.): No Difficulty  During the past week, to what extent has your arm, shoulder, or hand problem interfered with your normal social activites with family, friends, neighbors or groups?: Not  at all  During the past week, were you limited in your work or other regular daily activities as a result of your arm, shoulder or hand problem?: Slightly Limited  Arm, Shoulder, or hand pain: Mild  Tingling (pins and needles) in your arm, shoulder, or hand: None  During the past week, how much difficulty have you had sleeping because of the pain in your arm, shoulder or hand?: Mild Difficulty  Number of Questions Answered: 11  Quick DASH Score: 11.36         Time Calculation:   Start Time: 1130  Stop Time: 1213  Time Calculation (min): 43 min  Timed Charges  61579 - PT Therapeutic Exercise Minutes: 43  Total Minutes  Timed Charges Total Minutes: 43   Total Minutes: 43  Therapy Charges for Today     Code Description Service Date Service Provider Modifiers Qty    65363363592 HC PT THER PROC EA 15 MIN 1/20/2022 Pritesh Dumont, PT GP 3          PT G-Codes  Outcome Measure Options: Quick DASH  Quick DASH Score: 11.36         Pritesh Dumont PT  1/20/2022

## 2022-01-27 ENCOUNTER — HOSPITAL ENCOUNTER (OUTPATIENT)
Dept: PHYSICAL THERAPY | Facility: HOSPITAL | Age: 27
Discharge: HOME | End: 2022-01-27
Admitting: ORTHOPAEDIC SURGERY

## 2022-01-27 DIAGNOSIS — S52.124D CLOSED NONDISPLACED FRACTURE OF HEAD OF RIGHT RADIUS WITH ROUTINE HEALING, SUBSEQUENT ENCOUNTER: Primary | ICD-10-CM

## 2022-01-27 DIAGNOSIS — M25.621 DECREASED RANGE OF MOTION OF RIGHT ELBOW: ICD-10-CM

## 2022-01-27 DIAGNOSIS — R29.898 DECREASED GRIP STRENGTH OF RIGHT HAND: ICD-10-CM

## 2022-01-27 PROCEDURE — 97110 THERAPEUTIC EXERCISES: CPT

## 2022-01-27 NOTE — THERAPY TREATMENT NOTE
Outpatient Physical Therapy Ortho Treatment Note  [facility]     Patient Name: Arnold Mitchell  : 1995  MRN: [id number]  Today's Date: 2022      Visit Date: 2022    Visit Dx:    ICD-10-CM ICD-9-CM   1. Closed nondisplaced fracture of head of right radius with routine healing, subsequent encounter  S52.124D V54.12   2. Decreased range of motion of right elbow  M25.621 719.52   3. Decreased  strength of right hand  R29.898 729.89       There is no problem list on file for this patient.       No past medical history on file.     No past surgical history on file.                     PT Assessment/Plan     Row Name 22 1000          PT Assessment    Assessment Comments Arnold continues to progress and demonstrates ability to lift between 5-10# for all UE strengthening interventions. He continue to require intermittent cues to achieve optimal form and maintain proper dorsal scapular retraction to improve R shoulder stability. He tolerated addition of bent over row, triceps kick backs, push/pull at CC without pain. Patient to follow up with MD on 21 and pending report, patient may benefit from discharge in 1-2 session to finalized advanced HEP prior to discharging to independent program.  -SANTOSH            PT Plan    PT Plan Comments How did MD visit go. Potential d/c in 1-2 visits. Increase weight over 10#?  -SANTOSH           User Key  (r) = Recorded By, (t) = Taken By, (c) = Cosigned By    Initials Name Provider Type    Iveth Sun, PT Physical Therapist                   OP Exercises     Row Name 22 1000             Subjective Comments    Subjective Comments Im doing good. no pain. I see MD on   -SANTOSH              Subjective Pain    Able to rate subjective pain? yes  -SANTOSH      Pre-Treatment Pain Level 0  -SANTOSH              Total Minutes    73538 - PT Therapeutic Exercise Minutes 42  -SANTOSH              Exercise 1    Exercise Name 1 AROM elbow flexion/ext  -SANTOSH      Cueing 1  Verbal  -SANTOSH      Sets 1 3  -SANTOSH      Reps 1 10  -SANTOSH      Additional Comments 8#  -SANTOSH              Exercise 3    Exercise Name 3 AROM wrist flex/ext  -SANTOSH      Cueing 3 Verbal; Demo  -SANTOSH      Sets 3 2  -SANTOSH      Reps 3 10  -SANTOSH      Additional Comments 6#  -SANTOSH              Exercise 4    Exercise Name 4 low row and tband shldr ext  -SANTOSH      Cueing 4 Verbal; Demo  -SANTOSH      Sets 4 2  -SANTOSH      Reps 4 10  -SANTOSH      Additional Comments 2 plates at CC  -SANTOSH              Exercise 5    Exercise Name 5 White gripper squeeze  -SANTOSH      Cueing 5 Verbal; Tactile  -SANTOSH      Sets 5 1  -SANTOSH      Reps 5 15  -SANTOSH      Time 5 5s  -SANTOSH              Exercise 6    Exercise Name 6 D2 shldr flex  -SANTOSH      Cueing 6 Verbal; Tactile; Demo  -SANTOSH      Sets 6 2  -SANTOSH      Reps 6 10  -SANTOSH      Additional Comments 6#  -SANTOSH              Exercise 8    Exercise Name 8 Green flex bar  -SANTOSH      Cueing 8 Verbal; Demo  -SANTOSH      Reps 8 20 each  -SANTOSH      Additional Comments U, upside down U and twisting  -SANTOSH              Exercise 9    Exercise Name 9 shoulder flexion with palm up, down, and thumb up with neutral wrist  -SANTOSH      Cueing 9 Verbal; Demo  -SANTOSH      Sets 9 2  -SANTOSH      Reps 9 10ea  -SANTOSH      Additional Comments 6#  -SANTOSH              Exercise 10    Exercise Name 10 UBE  -SANTOSH      Cueing 10 Verbal  -SANTOSH      Time 10 3 minutes forward, 3 minutes retro  -SANTOSH      Additional Comments L3  -SANTOSH              Exercise 11    Exercise Name 11 Shoulder : shldrs flexed to 90 deg with theraloop around hands, rotating through shoulder IR/ER simultaneous, like driving a steering wheel  -SANTOSH      Cueing 11 Verbal; Demo  -SANTOSH      Sets 11 3  -SANTOSH      Reps 11 10  -SANTOSH      Additional Comments RTB  -SANTOSH              Exercise 12    Exercise Name 12 mallet sup/pro/UD/RD  -SANTOSH      Cueing 12 Verbal; Demo  -SANTOSH      Reps 12 20  -SANTOSH      Time 12 3s  -SANTOSH      Additional Comments 6#  -SANTOSH              Exercise 13    Exercise Name 13 tricep kick back  -SANTOSH      Cueing 13 Verbal; Demo  -SANTOSH      Sets  13 2  -SANTOSH      Reps 13 10  -SANTOSH      Additional Comments 6lbs  -SANTOSH              Exercise 14    Exercise Name 14 DB overhead press  -SANTOSH      Cueing 14 Verbal; Demo  -SANTOSH      Sets 14 2  -SANTOSH      Reps 14 10  -SANTOSH      Additional Comments 8#  -SANTOSH              Exercise 15    Exercise Name 15 horizontal shoulder abd/add  -SANTOSH      Cueing 15 Verbal; Demo  -SANTOSH      Sets 15 2  -SANTOSH      Reps 15 10  -SANOTSH      Time 15 thumb up/palm up  -SANTOSH      Additional Comments GTB  -SANTOSH              Exercise 16    Exercise Name 16 bent over row  -SANTOSH      Cueing 16 Verbal; Demo  -SANTOSH      Sets 16 2  -SANTOSH      Reps 16 10  -SANTOSH      Additional Comments 10#  -SANTOSH              Exercise 17    Exercise Name 17 push/pull at CC  -SANTOSH      Cueing 17 Verbal; Demo  -SANTOSH      Sets 17 1  -SANTOSH      Reps 17 20  -SANTOSH      Additional Comments 2 plates  -SANTOSH            User Key  (r) = Recorded By, (t) = Taken By, (c) = Cosigned By    Initials Name Provider Type    Iveth Sun, PT Physical Therapist                              PT OP Goals     Row Name 01/27/22 0900          PT Short Term Goals    STG 1 Pt will be independent and compliant with initial HEP for long term management of symptoms and prevention of future occurrence.  -SANTOSH     STG 1 Progress Met  -SANTOSH     STG 2 Pt will report pain rated 3/10 at worst in order to demonstrate ability to return to normalized ADLs, functional activities, and light duty work related activities.  -     STG 2 Progress Met  -SANTOSH     STG 3 Pt will demonstrate R elbow ROM ext=5 deg in order to improve ability to perform ADLs.  -     STG 3 Progress Met  -SANTOSH            Long Term Goals    LTG 1 Pt will be independent and compliant with advanced HEP for long term management of symptoms and prevention of future occurrence.  -SANTOSH     LTG 1 Progress Met  -SANTOSH     LTG 2 Pt will demonstrate R elbow ROM 0-140 in order to improve ability to perform ADLs and participate and functional related tasks.  -SANTOSH     LTG 2 Progress Met  -SANTOSH      LTG 3 Pt will demonstrate R  strength to 50# in order to increase ability to perform fine motor tasks including gripping and writing while participating in work related duties.  -     LTG 3 Progress Met  -     LTG 4 Pt will reduce level of perceived disability as measured by the DASH  to 25% in order to improve QOL.  -     LTG 4 Progress Met  -     LTG 5 Patient will report returning to recreational activity of playing pool for 3x with 0/10 pain to return to leisure activities and PLOF.  -     LTG 5 Progress Ongoing  -     LTG 6 Patient will report returning to full duty for 2 weeks with </= 2/10 R elbow pain with all work related duties to demonstrate ability to return to work and PLOF.  -     LTG 6 Progress Ongoing  -           User Key  (r) = Recorded By, (t) = Taken By, (c) = Cosigned By    Initials Name Provider Type    Iveth Sun, PT Physical Therapist                               Time Calculation:   Start Time: 1000  Stop Time: 1042  Time Calculation (min): 42 min  Timed Charges  35620 - PT Therapeutic Exercise Minutes: 42  Total Minutes  Timed Charges Total Minutes: 42   Total Minutes: 42  Therapy Charges for Today     Code Description Service Date Service Provider Modifiers Qty    80371193809 HC PT THER PROC EA 15 MIN 1/27/2022 Iveth Miller, PT GP 3                    Iveth Miller, PT  1/27/2022

## 2022-01-31 ENCOUNTER — OFFICE VISIT (OUTPATIENT)
Dept: ORTHOPEDIC SURGERY | Facility: CLINIC | Age: 27
End: 2022-01-31

## 2022-01-31 VITALS — BODY MASS INDEX: 26.96 KG/M2 | TEMPERATURE: 97.1 F | WEIGHT: 182 LBS | HEIGHT: 69 IN | RESPIRATION RATE: 18 BRPM

## 2022-01-31 DIAGNOSIS — S52.124D CLOSED NONDISPLACED FRACTURE OF HEAD OF RIGHT RADIUS WITH ROUTINE HEALING, SUBSEQUENT ENCOUNTER: Primary | ICD-10-CM

## 2022-01-31 PROCEDURE — 99212 OFFICE O/P EST SF 10 MIN: CPT | Performed by: ORTHOPAEDIC SURGERY

## 2022-01-31 PROCEDURE — 73070 X-RAY EXAM OF ELBOW: CPT | Performed by: ORTHOPAEDIC SURGERY

## 2022-01-31 NOTE — PROGRESS NOTES
"Shoulder Follow Up      Patient: Arnold Mithcell        YOB: 1995      CC: Right elbow fracture       History of Present Illness: Patient is now however 2 months from the right radial head fracture.  He was treated nonoperatively is doing therapy.  He states he feels pretty much full back to normal has full range of motion no overt pain.      Physical Exam: 26 y.o. male  General Appearance:    Alert, cooperative, in no acute distress                   Vitals:    01/31/22 0948   Resp: 18   Temp: 97.1 °F (36.2 °C)   Weight: 82.6 kg (182 lb)   Height: 175.3 cm (69\")        Patient is alert and read ×3 no acute distress appears her above-listed at height weight and age.  Affect is normal respiratory rate is normal unlabored. Heart rate regular rate rhythm, sclera, dentition and hearing are normal for the purpose of this exam.      Ortho Exam    Right upper extremity: No tenderness palpation of the elbow.  He has full pronation supination as well as elbow flexion extension with no pain or discomfort.  Sensation tact distally light touch.      Radiographs: 2 views of the right elbow AP and lateral taken reviewed show evidence of healed right radial head fracture compared to previous films no other significant findings.    Assessment/Plan:      Status post right radial head fracture    Patient is progressed quite wel imaging shows fracture is healed and on exam he is pain-free and has full motion.  This point he can continue to progress to full activity as tolerated.  Follow-up as needed.  All questions were answered.  Patient understands and agrees with plan.            "

## 2022-02-03 ENCOUNTER — HOSPITAL ENCOUNTER (OUTPATIENT)
Dept: PHYSICAL THERAPY | Facility: HOSPITAL | Age: 27
Discharge: HOME | End: 2022-02-03
Admitting: FAMILY MEDICINE

## 2022-02-03 DIAGNOSIS — S52.124D CLOSED NONDISPLACED FRACTURE OF HEAD OF RIGHT RADIUS WITH ROUTINE HEALING, SUBSEQUENT ENCOUNTER: Primary | ICD-10-CM

## 2022-02-03 DIAGNOSIS — M25.621 DECREASED RANGE OF MOTION OF RIGHT ELBOW: ICD-10-CM

## 2022-02-03 DIAGNOSIS — R29.898 DECREASED GRIP STRENGTH OF RIGHT HAND: ICD-10-CM

## 2022-02-03 PROCEDURE — 97110 THERAPEUTIC EXERCISES: CPT

## 2022-02-03 NOTE — THERAPY DISCHARGE NOTE
Outpatient Physical Therapy Ortho Treatment Note/Discharge Summary  [facility]     Patient Name: Arnold Mitchell  : 1995  MRN: [id number]  Today's Date: 2/3/2022      Visit Date: 2022    Visit Dx:    ICD-10-CM ICD-9-CM   1. Closed nondisplaced fracture of head of right radius with routine healing, subsequent encounter  S52.124D V54.12   2. Decreased range of motion of right elbow  M25.621 719.52   3. Decreased  strength of right hand  R29.898 729.89       There is no problem list on file for this patient.       No past medical history on file.     No past surgical history on file.                     PT Assessment/Plan     Row Name 22 1100          PT Assessment    Assessment Comments Arnold Mitchell was seen for 12 physical therapy sessions for R radial head/neck fracture.  Treatment included therapeutic exercise, manual therapy and patient education with home exercise program . Progress to physical therapy goals was excellent. Pt met all STG and all LTG. He reports minimal to no pain, and able to return to job with no issues. He verbalized good understanding of appropriate weight lifting progressions moving forward, and denies questions concerns. Feels he is ready for indep management. He was discharged to an independent [facility] and provided patient education to self-manage condition.  -CC            PT Plan    PT Plan Comments discharged  -CC           User Key  (r) = Recorded By, (t) = Taken By, (c) = Cosigned By    Initials Name Provider Type    Madhavi Hawthorne, PT Physical Therapist                     OP Exercises     Row Name 22 1000             Subjective Comments    Subjective Comments Got cleared by  -CC              Subjective Pain    Able to rate subjective pain? yes  -CC      Pre-Treatment Pain Level 0  -CC              Total Minutes    84126 - PT Therapeutic Exercise Minutes 25  -CC              Exercise 1    Exercise Name 1 AROM elbow flexion/ext  -CC      Cueing 1  Verbal  -CC      Sets 1 3  -CC      Reps 1 10  -CC      Additional Comments 10#  -CC              Exercise 2    Exercise Name 2 attempted mat push up and wall push up  -CC      Additional Comments mild tenderness so held  -CC              Exercise 4    Exercise Name 4 low row and tband shldr ext  -CC      Cueing 4 Verbal; Demo  -CC      Sets 4 2  -CC      Reps 4 10  -CC      Additional Comments BTB  -CC              Exercise 10    Exercise Name 10 UBE  -CC      Cueing 10 Verbal  -CC      Time 10 3 minutes forward, 3 minutes retro  -CC      Additional Comments L3  -CC              Exercise 11    Exercise Name 11 Shoulder : shldrs flexed to 90 deg with theraloop around hands, rotating through shoulder IR/ER simultaneous, like driving a steering wheel  -CC      Cueing 11 Verbal; Demo  -CC      Sets 11 3  -CC      Reps 11 10  -CC      Additional Comments RTB  -CC              Exercise 12    Exercise Name 12 skullcrusher  -CC      Cueing 12 Verbal  -CC      Reps 12 3x10  -CC      Time 12 8#  -CC      Additional Comments uni, arm supported with opp arm  -CC              Exercise 13    Exercise Name 13 tricep kick back  -CC      Cueing 13 Verbal; Demo  -CC      Sets 13 3  -CC      Reps 13 10  -CC      Additional Comments 8 lbs  -CC              Exercise 14    Exercise Name 14 DB overhead press  -CC      Cueing 14 Verbal; Demo  -CC      Sets 14 2  -CC      Reps 14 10  -CC      Additional Comments 8#  -CC              Exercise 15    Exercise Name 15 horizontal shoulder abd/add  -CC      Cueing 15 Verbal; Demo  -CC      Sets 15 2  -CC      Reps 15 10  -CC      Time 15 thumb up/palm up  -CC      Additional Comments GTB  -CC              Exercise 16    Exercise Name 16 bent over row  -CC      Cueing 16 Verbal; Demo  -CC      Sets 16 2  -CC      Reps 16 10  -CC      Additional Comments 10#  -CC            User Key  (r) = Recorded By, (t) = Taken By, (c) = Cosigned By    Initials Name Provider Type    CC Carosellnahed,  Madhavi, PT Physical Therapist                                PT OP Goals     Row Name 02/03/22 1000          PT Short Term Goals    STG 1 Pt will be independent and compliant with initial HEP for long term management of symptoms and prevention of future occurrence.  -CC     STG 1 Progress Met  -CC     STG 2 Pt will report pain rated 3/10 at worst in order to demonstrate ability to return to normalized ADLs, functional activities, and light duty work related activities.  -CC     STG 2 Progress Met  -CC     STG 3 Pt will demonstrate R elbow ROM ext=5 deg in order to improve ability to perform ADLs.  -CC     STG 3 Progress Met  -CC            Long Term Goals    LTG 1 Pt will be independent and compliant with advanced HEP for long term management of symptoms and prevention of future occurrence.  -CC     LTG 1 Progress Met  -CC     LTG 2 Pt will demonstrate R elbow ROM 0-140 in order to improve ability to perform ADLs and participate and functional related tasks.  -CC     LTG 2 Progress Met  -CC     LTG 3 Pt will demonstrate R  strength to 50# in order to increase ability to perform fine motor tasks including gripping and writing while participating in work related duties.  -CC     LTG 3 Progress Met  -CC     LTG 4 Pt will reduce level of perceived disability as measured by the DASH  to 25% in order to improve QOL.  -CC     LTG 4 Progress Met  -CC     LTG 5 Patient will report returning to recreational activity of playing pool for 3x with 0/10 pain to return to leisure activities and PLOF.  -CC     LTG 5 Progress Met  -CC     LTG 6 Patient will report returning to full duty for 2 weeks with </= 2/10 R elbow pain with all work related duties to demonstrate ability to return to work and PLOF.  -CC     LTG 6 Progress Met  -CC           User Key  (r) = Recorded By, (t) = Taken By, (c) = Cosigned By    Initials Name Provider Type    Madhavi Hawthorne, PT Physical Therapist                               Time  Calculation:   Start Time: 1040  Stop Time: 1105  Time Calculation (min): 25 min  Total Timed Code Minutes- PT: 25 minute(s)  Timed Charges  73133 - PT Therapeutic Exercise Minutes: 25  Total Minutes  Timed Charges Total Minutes: 25   Total Minutes: 25  Therapy Charges for Today     Code Description Service Date Service Provider Modifiers Qty    52285672719 HC PT THER PROC EA 15 MIN 2/3/2022 Madhavi Downey, PT GP 2                OP PT Discharge Summary  Date of Discharge: 02/03/22  Reason for Discharge: Independent, Patient/Caregiver request  Outcomes Achieved: Refer to plan of care for updates on goals achieved  Discharge Destination: Home with home program      Madhavi Downey, PT  2/3/2022

## 2022-02-10 ENCOUNTER — APPOINTMENT (OUTPATIENT)
Dept: PHYSICAL THERAPY | Facility: HOSPITAL | Age: 27
End: 2022-02-10